# Patient Record
Sex: FEMALE | Race: WHITE | NOT HISPANIC OR LATINO | URBAN - METROPOLITAN AREA
[De-identification: names, ages, dates, MRNs, and addresses within clinical notes are randomized per-mention and may not be internally consistent; named-entity substitution may affect disease eponyms.]

---

## 2018-01-16 ENCOUNTER — EMERGENCY (EMERGENCY)
Facility: HOSPITAL | Age: 28
LOS: 1 days | Discharge: HOME | End: 2018-01-16
Attending: EMERGENCY MEDICINE | Admitting: EMERGENCY MEDICINE
Payer: COMMERCIAL

## 2018-01-16 VITALS
RESPIRATION RATE: 16 BRPM | HEART RATE: 77 BPM | TEMPERATURE: 99 F | SYSTOLIC BLOOD PRESSURE: 145 MMHG | OXYGEN SATURATION: 98 % | DIASTOLIC BLOOD PRESSURE: 82 MMHG | WEIGHT: 244.93 LBS

## 2018-01-16 VITALS
HEART RATE: 72 BPM | RESPIRATION RATE: 15 BRPM | OXYGEN SATURATION: 99 % | TEMPERATURE: 98 F | DIASTOLIC BLOOD PRESSURE: 78 MMHG | SYSTOLIC BLOOD PRESSURE: 141 MMHG

## 2018-01-16 PROCEDURE — 72125 CT NECK SPINE W/O DYE: CPT

## 2018-01-16 PROCEDURE — 71046 X-RAY EXAM CHEST 2 VIEWS: CPT | Mod: 26

## 2018-01-16 PROCEDURE — 72040 X-RAY EXAM NECK SPINE 2-3 VW: CPT | Mod: 26

## 2018-01-16 PROCEDURE — 99284 EMERGENCY DEPT VISIT MOD MDM: CPT

## 2018-01-16 PROCEDURE — 93005 ELECTROCARDIOGRAM TRACING: CPT

## 2018-01-16 PROCEDURE — 72040 X-RAY EXAM NECK SPINE 2-3 VW: CPT

## 2018-01-16 PROCEDURE — 99284 EMERGENCY DEPT VISIT MOD MDM: CPT | Mod: 25

## 2018-01-16 PROCEDURE — 71046 X-RAY EXAM CHEST 2 VIEWS: CPT

## 2018-01-16 PROCEDURE — 72125 CT NECK SPINE W/O DYE: CPT | Mod: 26

## 2018-01-16 RX ORDER — IBUPROFEN 200 MG
600 TABLET ORAL ONCE
Qty: 0 | Refills: 0 | Status: COMPLETED | OUTPATIENT
Start: 2018-01-16 | End: 2018-01-16

## 2018-01-16 RX ORDER — METHOCARBAMOL 500 MG/1
1500 TABLET, FILM COATED ORAL ONCE
Qty: 0 | Refills: 0 | Status: COMPLETED | OUTPATIENT
Start: 2018-01-16 | End: 2018-01-16

## 2018-01-16 RX ORDER — IBUPROFEN 200 MG
1 TABLET ORAL
Qty: 15 | Refills: 0
Start: 2018-01-16 | End: 2018-01-20

## 2018-01-16 RX ORDER — METHOCARBAMOL 500 MG/1
1 TABLET, FILM COATED ORAL
Qty: 15 | Refills: 0
Start: 2018-01-16 | End: 2018-01-20

## 2018-01-16 RX ADMIN — Medication 600 MILLIGRAM(S): at 18:52

## 2018-01-16 RX ADMIN — Medication 600 MILLIGRAM(S): at 17:53

## 2018-01-16 RX ADMIN — METHOCARBAMOL 1500 MILLIGRAM(S): 500 TABLET, FILM COATED ORAL at 17:53

## 2018-01-16 NOTE — ED PROVIDER NOTE - CARDIAC, MLM
NO CHEST WALL TENDERNESS. NO CHEST WALL DEFORMITY. Normal rate, regular rhythm.  Heart sounds S1, S2.  No murmurs, rubs or gallops.
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
## 2018-01-16 NOTE — ED PROVIDER NOTE - CHPI ED SYMPTOMS NEG
no difficulty bearing weight/no decreased eating/drinking/no disorientation/no laceration/no back pain/no headache/no dizziness
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
## 2018-01-16 NOTE — ED PROVIDER NOTE - PHYSICAL EXAMINATION
Spine Exam: Cervical: No erythema, ecchymosis, or visible deformity. No midline tenderness or step-off appreciated on palpation. +left trapezius ttp. no paravertebral tenderness. + left trapezius muscle spasm. FROM. NEG NEXUS criteria.   Thoracic: No erythema, ecchymosis, or visible deformity. No midline tenderness or step-off appreciated on palpation. No paravertebral tenderness. No muscle spasm.   Lumbosacral: No erythema, ecchymosis, or visible deformity. No midline tenderness or step-off appreciated on palpation. No paravertebral tenderness. No muscle spasm.  MS: FROM OF BL UE. SENSATION GROSSLY INTACT BL UE.   PV:+ 2 RADIAL PULSES
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
## 2020-01-18 ENCOUNTER — TRANSCRIPTION ENCOUNTER (OUTPATIENT)
Age: 30
End: 2020-01-18

## 2020-02-07 ENCOUNTER — TRANSCRIPTION ENCOUNTER (OUTPATIENT)
Age: 30
End: 2020-02-07

## 2020-05-28 ENCOUNTER — INPATIENT (INPATIENT)
Facility: HOSPITAL | Age: 30
LOS: 2 days | Discharge: HOME | End: 2020-05-31
Attending: OBSTETRICS & GYNECOLOGY | Admitting: OBSTETRICS & GYNECOLOGY
Payer: COMMERCIAL

## 2020-05-28 VITALS — HEIGHT: 66 IN | WEIGHT: 253.53 LBS

## 2020-05-28 DIAGNOSIS — O48.0 POST-TERM PREGNANCY: ICD-10-CM

## 2020-05-28 LAB
BASOPHILS # BLD AUTO: 0.03 K/UL — SIGNIFICANT CHANGE UP (ref 0–0.2)
BASOPHILS NFR BLD AUTO: 0.2 % — SIGNIFICANT CHANGE UP (ref 0–2)
BLD GP AB SCN SERPL QL: NEGATIVE — SIGNIFICANT CHANGE UP
EOSINOPHIL # BLD AUTO: 0.13 K/UL — SIGNIFICANT CHANGE UP (ref 0–0.5)
EOSINOPHIL NFR BLD AUTO: 1 % — SIGNIFICANT CHANGE UP (ref 0–6)
HCT VFR BLD CALC: 36 % — SIGNIFICANT CHANGE UP (ref 34.5–45)
HGB BLD-MCNC: 11.9 G/DL — SIGNIFICANT CHANGE UP (ref 11.5–15.5)
IMM GRANULOCYTES NFR BLD AUTO: 0.5 % — SIGNIFICANT CHANGE UP (ref 0–1.5)
LYMPHOCYTES # BLD AUTO: 2.51 K/UL — SIGNIFICANT CHANGE UP (ref 1–3.3)
LYMPHOCYTES # BLD AUTO: 20.1 % — SIGNIFICANT CHANGE UP (ref 13–44)
MCHC RBC-ENTMCNC: 26.8 PG — LOW (ref 27–34)
MCHC RBC-ENTMCNC: 33.1 GM/DL — SIGNIFICANT CHANGE UP (ref 32–36)
MCV RBC AUTO: 81.1 FL — SIGNIFICANT CHANGE UP (ref 80–100)
MONOCYTES # BLD AUTO: 0.88 K/UL — SIGNIFICANT CHANGE UP (ref 0–0.9)
MONOCYTES NFR BLD AUTO: 7 % — SIGNIFICANT CHANGE UP (ref 2–14)
NEUTROPHILS # BLD AUTO: 8.89 K/UL — HIGH (ref 1.8–7.4)
NEUTROPHILS NFR BLD AUTO: 71.2 % — SIGNIFICANT CHANGE UP (ref 43–77)
NRBC # BLD: 0 /100 WBCS — SIGNIFICANT CHANGE UP (ref 0–0)
PLATELET # BLD AUTO: 215 K/UL — SIGNIFICANT CHANGE UP (ref 150–400)
RBC # BLD: 4.44 M/UL — SIGNIFICANT CHANGE UP (ref 3.8–5.2)
RBC # FLD: 14.9 % — HIGH (ref 10.3–14.5)
RH IG SCN BLD-IMP: POSITIVE — SIGNIFICANT CHANGE UP
RH IG SCN BLD-IMP: POSITIVE — SIGNIFICANT CHANGE UP
SARS-COV-2 RNA SPEC QL NAA+PROBE: SIGNIFICANT CHANGE UP
WBC # BLD: 12.5 K/UL — HIGH (ref 3.8–10.5)
WBC # FLD AUTO: 12.5 K/UL — HIGH (ref 3.8–10.5)

## 2020-05-28 RX ORDER — OXYTOCIN 10 UNIT/ML
333.33 VIAL (ML) INJECTION
Qty: 20 | Refills: 0 | Status: DISCONTINUED | OUTPATIENT
Start: 2020-05-28 | End: 2020-05-29

## 2020-05-28 RX ORDER — VANCOMYCIN HCL 1 G
1000 VIAL (EA) INTRAVENOUS EVERY 12 HOURS
Refills: 0 | Status: DISCONTINUED | OUTPATIENT
Start: 2020-05-29 | End: 2020-05-29

## 2020-05-28 RX ORDER — VANCOMYCIN HCL 1 G
1000 VIAL (EA) INTRAVENOUS ONCE
Refills: 0 | Status: COMPLETED | OUTPATIENT
Start: 2020-05-28 | End: 2020-05-28

## 2020-05-28 RX ORDER — SODIUM CHLORIDE 9 MG/ML
1000 INJECTION, SOLUTION INTRAVENOUS
Refills: 0 | Status: DISCONTINUED | OUTPATIENT
Start: 2020-05-28 | End: 2020-05-29

## 2020-05-28 RX ORDER — CITRIC ACID/SODIUM CITRATE 300-500 MG
15 SOLUTION, ORAL ORAL EVERY 6 HOURS
Refills: 0 | Status: DISCONTINUED | OUTPATIENT
Start: 2020-05-28 | End: 2020-05-29

## 2020-05-28 RX ORDER — VANCOMYCIN HCL 1 G
VIAL (EA) INTRAVENOUS
Refills: 0 | Status: DISCONTINUED | OUTPATIENT
Start: 2020-05-28 | End: 2020-05-29

## 2020-05-28 RX ADMIN — SODIUM CHLORIDE 125 MILLILITER(S): 9 INJECTION, SOLUTION INTRAVENOUS at 19:47

## 2020-05-28 RX ADMIN — Medication 250 MILLIGRAM(S): at 20:13

## 2020-05-29 LAB
ALBUMIN SERPL ELPH-MCNC: 3.4 G/DL — SIGNIFICANT CHANGE UP (ref 3.3–5)
ALP SERPL-CCNC: 134 U/L — HIGH (ref 40–120)
ALT FLD-CCNC: 13 U/L — SIGNIFICANT CHANGE UP (ref 10–45)
ANION GAP SERPL CALC-SCNC: 13 MMOL/L — SIGNIFICANT CHANGE UP (ref 5–17)
APPEARANCE UR: ABNORMAL
APTT BLD: 29.7 SEC — SIGNIFICANT CHANGE UP (ref 27.5–36.3)
AST SERPL-CCNC: 21 U/L — SIGNIFICANT CHANGE UP (ref 10–40)
BACTERIA # UR AUTO: NEGATIVE — SIGNIFICANT CHANGE UP
BILIRUB SERPL-MCNC: 0.1 MG/DL — LOW (ref 0.2–1.2)
BILIRUB UR-MCNC: NEGATIVE — SIGNIFICANT CHANGE UP
BUN SERPL-MCNC: 8 MG/DL — SIGNIFICANT CHANGE UP (ref 7–23)
CALCIUM SERPL-MCNC: 9.4 MG/DL — SIGNIFICANT CHANGE UP (ref 8.4–10.5)
CHLORIDE SERPL-SCNC: 105 MMOL/L — SIGNIFICANT CHANGE UP (ref 96–108)
CO2 SERPL-SCNC: 20 MMOL/L — LOW (ref 22–31)
COLOR SPEC: YELLOW — SIGNIFICANT CHANGE UP
CREAT ?TM UR-MCNC: 105 MG/DL — SIGNIFICANT CHANGE UP
CREAT SERPL-MCNC: 0.59 MG/DL — SIGNIFICANT CHANGE UP (ref 0.5–1.3)
DIFF PNL FLD: ABNORMAL
EPI CELLS # UR: 6 /HPF — HIGH
FIBRINOGEN PPP-MCNC: 683 MG/DL — HIGH (ref 350–510)
GLUCOSE SERPL-MCNC: 97 MG/DL — SIGNIFICANT CHANGE UP (ref 70–99)
GLUCOSE UR QL: NEGATIVE — SIGNIFICANT CHANGE UP
HYALINE CASTS # UR AUTO: 5 /LPF — HIGH (ref 0–2)
INR BLD: 0.9 RATIO — SIGNIFICANT CHANGE UP (ref 0.88–1.16)
KETONES UR-MCNC: NEGATIVE — SIGNIFICANT CHANGE UP
LDH SERPL L TO P-CCNC: 180 U/L — SIGNIFICANT CHANGE UP (ref 50–242)
LEUKOCYTE ESTERASE UR-ACNC: NEGATIVE — SIGNIFICANT CHANGE UP
NITRITE UR-MCNC: NEGATIVE — SIGNIFICANT CHANGE UP
PH UR: 6 — SIGNIFICANT CHANGE UP (ref 5–8)
POTASSIUM SERPL-MCNC: 4.6 MMOL/L — SIGNIFICANT CHANGE UP (ref 3.5–5.3)
POTASSIUM SERPL-SCNC: 4.6 MMOL/L — SIGNIFICANT CHANGE UP (ref 3.5–5.3)
PROT ?TM UR-MCNC: 17 MG/DL — HIGH (ref 0–12)
PROT SERPL-MCNC: 6.3 G/DL — SIGNIFICANT CHANGE UP (ref 6–8.3)
PROT UR-MCNC: ABNORMAL
PROT/CREAT UR-RTO: 0.2 RATIO — SIGNIFICANT CHANGE UP (ref 0–0.2)
PROTHROM AB SERPL-ACNC: 10.3 SEC — SIGNIFICANT CHANGE UP (ref 10–12.9)
RBC CASTS # UR COMP ASSIST: 58 /HPF — HIGH (ref 0–4)
SODIUM SERPL-SCNC: 138 MMOL/L — SIGNIFICANT CHANGE UP (ref 135–145)
SP GR SPEC: 1.02 — SIGNIFICANT CHANGE UP (ref 1.01–1.02)
T PALLIDUM AB TITR SER: NEGATIVE — SIGNIFICANT CHANGE UP
URATE SERPL-MCNC: 5.7 MG/DL — SIGNIFICANT CHANGE UP (ref 2.5–7)
UROBILINOGEN FLD QL: NEGATIVE — SIGNIFICANT CHANGE UP
WBC UR QL: 3 /HPF — SIGNIFICANT CHANGE UP (ref 0–5)

## 2020-05-29 RX ORDER — SIMETHICONE 80 MG/1
80 TABLET, CHEWABLE ORAL EVERY 4 HOURS
Refills: 0 | Status: DISCONTINUED | OUTPATIENT
Start: 2020-05-29 | End: 2020-05-31

## 2020-05-29 RX ORDER — OXYTOCIN 10 UNIT/ML
333.33 VIAL (ML) INJECTION
Qty: 20 | Refills: 0 | Status: DISCONTINUED | OUTPATIENT
Start: 2020-05-29 | End: 2020-05-31

## 2020-05-29 RX ORDER — OXYCODONE HYDROCHLORIDE 5 MG/1
5 TABLET ORAL
Refills: 0 | Status: DISCONTINUED | OUTPATIENT
Start: 2020-05-29 | End: 2020-05-31

## 2020-05-29 RX ORDER — DIBUCAINE 1 %
1 OINTMENT (GRAM) RECTAL EVERY 6 HOURS
Refills: 0 | Status: DISCONTINUED | OUTPATIENT
Start: 2020-05-29 | End: 2020-05-31

## 2020-05-29 RX ORDER — OXYCODONE HYDROCHLORIDE 5 MG/1
5 TABLET ORAL ONCE
Refills: 0 | Status: DISCONTINUED | OUTPATIENT
Start: 2020-05-29 | End: 2020-05-31

## 2020-05-29 RX ORDER — KETOROLAC TROMETHAMINE 30 MG/ML
30 SYRINGE (ML) INJECTION ONCE
Refills: 0 | Status: DISCONTINUED | OUTPATIENT
Start: 2020-05-29 | End: 2020-05-29

## 2020-05-29 RX ORDER — BENZOCAINE 10 %
1 GEL (GRAM) MUCOUS MEMBRANE EVERY 6 HOURS
Refills: 0 | Status: DISCONTINUED | OUTPATIENT
Start: 2020-05-29 | End: 2020-05-31

## 2020-05-29 RX ORDER — IBUPROFEN 200 MG
600 TABLET ORAL EVERY 6 HOURS
Refills: 0 | Status: COMPLETED | OUTPATIENT
Start: 2020-05-29 | End: 2021-04-27

## 2020-05-29 RX ORDER — LANOLIN
1 OINTMENT (GRAM) TOPICAL EVERY 6 HOURS
Refills: 0 | Status: DISCONTINUED | OUTPATIENT
Start: 2020-05-29 | End: 2020-05-31

## 2020-05-29 RX ORDER — ACETAMINOPHEN 500 MG
975 TABLET ORAL
Refills: 0 | Status: DISCONTINUED | OUTPATIENT
Start: 2020-05-29 | End: 2020-05-31

## 2020-05-29 RX ORDER — DIPHENHYDRAMINE HCL 50 MG
25 CAPSULE ORAL EVERY 6 HOURS
Refills: 0 | Status: DISCONTINUED | OUTPATIENT
Start: 2020-05-29 | End: 2020-05-31

## 2020-05-29 RX ORDER — MAGNESIUM HYDROXIDE 400 MG/1
30 TABLET, CHEWABLE ORAL
Refills: 0 | Status: DISCONTINUED | OUTPATIENT
Start: 2020-05-29 | End: 2020-05-31

## 2020-05-29 RX ORDER — OXYTOCIN 10 UNIT/ML
4 VIAL (ML) INJECTION
Qty: 30 | Refills: 0 | Status: DISCONTINUED | OUTPATIENT
Start: 2020-05-29 | End: 2020-05-31

## 2020-05-29 RX ORDER — SODIUM CHLORIDE 9 MG/ML
3 INJECTION INTRAMUSCULAR; INTRAVENOUS; SUBCUTANEOUS EVERY 8 HOURS
Refills: 0 | Status: DISCONTINUED | OUTPATIENT
Start: 2020-05-29 | End: 2020-05-31

## 2020-05-29 RX ORDER — TETANUS TOXOID, REDUCED DIPHTHERIA TOXOID AND ACELLULAR PERTUSSIS VACCINE, ADSORBED 5; 2.5; 8; 8; 2.5 [IU]/.5ML; [IU]/.5ML; UG/.5ML; UG/.5ML; UG/.5ML
0.5 SUSPENSION INTRAMUSCULAR ONCE
Refills: 0 | Status: DISCONTINUED | OUTPATIENT
Start: 2020-05-29 | End: 2020-05-31

## 2020-05-29 RX ORDER — AER TRAVELER 0.5 G/1
1 SOLUTION RECTAL; TOPICAL EVERY 4 HOURS
Refills: 0 | Status: DISCONTINUED | OUTPATIENT
Start: 2020-05-29 | End: 2020-05-31

## 2020-05-29 RX ORDER — PRAMOXINE HYDROCHLORIDE 150 MG/15G
1 AEROSOL, FOAM RECTAL EVERY 4 HOURS
Refills: 0 | Status: DISCONTINUED | OUTPATIENT
Start: 2020-05-29 | End: 2020-05-31

## 2020-05-29 RX ORDER — HYDROCORTISONE 1 %
1 OINTMENT (GRAM) TOPICAL EVERY 6 HOURS
Refills: 0 | Status: DISCONTINUED | OUTPATIENT
Start: 2020-05-29 | End: 2020-05-31

## 2020-05-29 RX ADMIN — Medication 250 MILLIGRAM(S): at 10:30

## 2020-05-29 RX ADMIN — Medication 30 MILLIGRAM(S): at 16:40

## 2020-05-29 RX ADMIN — Medication 4 MILLIUNIT(S)/MIN: at 08:06

## 2020-05-29 RX ADMIN — Medication 975 MILLIGRAM(S): at 21:45

## 2020-05-29 RX ADMIN — SODIUM CHLORIDE 125 MILLILITER(S): 9 INJECTION, SOLUTION INTRAVENOUS at 04:21

## 2020-05-30 RX ORDER — IBUPROFEN 200 MG
600 TABLET ORAL EVERY 6 HOURS
Refills: 0 | Status: DISCONTINUED | OUTPATIENT
Start: 2020-05-30 | End: 2020-05-31

## 2020-05-30 RX ADMIN — Medication 600 MILLIGRAM(S): at 09:26

## 2020-05-30 RX ADMIN — Medication 600 MILLIGRAM(S): at 15:08

## 2020-05-30 RX ADMIN — SODIUM CHLORIDE 3 MILLILITER(S): 9 INJECTION INTRAMUSCULAR; INTRAVENOUS; SUBCUTANEOUS at 06:05

## 2020-05-30 RX ADMIN — Medication 975 MILLIGRAM(S): at 12:29

## 2020-05-30 RX ADMIN — Medication 975 MILLIGRAM(S): at 06:14

## 2020-05-30 RX ADMIN — Medication 975 MILLIGRAM(S): at 18:07

## 2020-05-30 RX ADMIN — OXYCODONE HYDROCHLORIDE 5 MILLIGRAM(S): 5 TABLET ORAL at 06:14

## 2020-05-30 RX ADMIN — Medication 600 MILLIGRAM(S): at 00:19

## 2020-05-30 RX ADMIN — Medication 1 TABLET(S): at 12:29

## 2020-05-30 RX ADMIN — OXYCODONE HYDROCHLORIDE 5 MILLIGRAM(S): 5 TABLET ORAL at 12:31

## 2020-05-30 RX ADMIN — MAGNESIUM HYDROXIDE 30 MILLILITER(S): 400 TABLET, CHEWABLE ORAL at 06:12

## 2020-05-30 RX ADMIN — Medication 600 MILLIGRAM(S): at 20:23

## 2020-05-30 NOTE — PROGRESS NOTE ADULT - SUBJECTIVE AND OBJECTIVE BOX
OB Progress Note:  PPD#1    S: 28yo  PPD#1 s/p . Patient feels well. Pain is well controlled, tolerating regular diet, passing flatus, voiding spontaneously, ambulating without difficulty. Denies heavy vaginal bleeding, CP/SOB, N/V, lightheadedness/dizziness.     O:  Vitals:  Vital Signs Last 24 Hrs  T(C): 36.8 (30 May 2020 01:00), Max: 36.8 (29 May 2020 22:32)  T(F): 98.2 (30 May 2020 01:00), Max: 98.2 (29 May 2020 22:32)  HR: 94 (30 May 2020 01:00) (82 - 106)  BP: 110/75 (30 May 2020 01:00) (102/50 - 150/68)  BP(mean): 101 (29 May 2020 20:06) (84 - 104)  RR: 18 (30 May 2020 01:00) (16 - 18)  SpO2: 98% (30 May 2020 01:00) (97% - 100%)    MEDICATIONS  (STANDING):  acetaminophen   Tablet .. 975 milliGRAM(s) Oral <User Schedule>  diphtheria/tetanus/pertussis (acellular) Vaccine (ADAcel) 0.5 milliLiter(s) IntraMuscular once  ibuprofen  Tablet. 600 milliGRAM(s) Oral every 6 hours  oxytocin Infusion 4 milliUNIT(s)/Min (4 mL/Hr) IV Continuous <Continuous>  oxytocin Infusion 333.333 milliUNIT(s)/Min (1000 mL/Hr) IV Continuous <Continuous>  prenatal multivitamin 1 Tablet(s) Oral daily  sodium chloride 0.9% lock flush 3 milliLiter(s) IV Push every 8 hours      Labs:  Blood type: A Positive  Rubella IgG: RPR: Negative                          11.9   12.50<H> >-----------< 215    (  @ 20:16 )             36.0    20 @ 01:27      138  |  105  |  8   ----------------------------<  97  4.6   |  20<L>  |  0.59        Ca    9.4      29 May 2020 01:27    TPro  6.3  /  Alb  3.4  /  TBili  0.1<L>  /  DBili  x   /  AST  21  /  ALT  13  /  AlkPhos  134<H>  20 @ 01:27          Physical Exam:  General: NAD  Abdomen: soft, non-tender, non-distended, fundus firm  Vaginal: No heavy vaginal bleeding  Extremities: No erythema/edema

## 2020-05-30 NOTE — PROGRESS NOTE ADULT - PROBLEM SELECTOR PLAN 1
- Pain well controlled, continue current pain regimen  - Increase ambulation, SCDs when not ambulating  - Continue regular diet    Mariajose Vicente, PGY-1

## 2020-05-30 NOTE — PROGRESS NOTE ADULT - SUBJECTIVE AND OBJECTIVE BOX
PPD#1- ATTENDING NOTE    S: Patient doing well. Minimal lochia. Pain controlled.    O: Vital Signs Last 24 Hrs  T(C): 36.8 (30 May 2020 01:00), Max: 36.8 (29 May 2020 22:32)  T(F): 98.2 (30 May 2020 01:00), Max: 98.2 (29 May 2020 22:32)  HR: 94 (30 May 2020 01:00) (82 - 106)  BP: 110/75 (30 May 2020 01:00) (102/50 - 150/68)  BP(mean): 101 (29 May 2020 20:06) (84 - 104)  RR: 18 (30 May 2020 01:00) (16 - 18)  SpO2: 98% (30 May 2020 01:00) (97% - 100%)    Gen: NAD  Abd: soft, NT, ND, fundus firm below umbilicus  Lochia: moderate  Ext: no tenderness, no hyper reflexia,     Labs:                            11.9   12.50 )-----------( 215      ( 28 May 2020 20:16 )             36.0       A: 29y PPD#1 s/p  doing well.    Plan:  Analgesia prn  Regular diet    Routine post partum care

## 2020-05-31 ENCOUNTER — TRANSCRIPTION ENCOUNTER (OUTPATIENT)
Age: 30
End: 2020-05-31

## 2020-05-31 VITALS
HEART RATE: 74 BPM | RESPIRATION RATE: 18 BRPM | TEMPERATURE: 98 F | OXYGEN SATURATION: 98 % | SYSTOLIC BLOOD PRESSURE: 130 MMHG | DIASTOLIC BLOOD PRESSURE: 84 MMHG

## 2020-05-31 PROCEDURE — 85730 THROMBOPLASTIN TIME PARTIAL: CPT

## 2020-05-31 PROCEDURE — 84550 ASSAY OF BLOOD/URIC ACID: CPT

## 2020-05-31 PROCEDURE — 85027 COMPLETE CBC AUTOMATED: CPT

## 2020-05-31 PROCEDURE — 59025 FETAL NON-STRESS TEST: CPT

## 2020-05-31 PROCEDURE — 80053 COMPREHEN METABOLIC PANEL: CPT

## 2020-05-31 PROCEDURE — 84156 ASSAY OF PROTEIN URINE: CPT

## 2020-05-31 PROCEDURE — 81001 URINALYSIS AUTO W/SCOPE: CPT

## 2020-05-31 PROCEDURE — 86780 TREPONEMA PALLIDUM: CPT

## 2020-05-31 PROCEDURE — 86900 BLOOD TYPING SEROLOGIC ABO: CPT

## 2020-05-31 PROCEDURE — 59050 FETAL MONITOR W/REPORT: CPT

## 2020-05-31 PROCEDURE — 86850 RBC ANTIBODY SCREEN: CPT

## 2020-05-31 PROCEDURE — 82570 ASSAY OF URINE CREATININE: CPT

## 2020-05-31 PROCEDURE — 83615 LACTATE (LD) (LDH) ENZYME: CPT

## 2020-05-31 PROCEDURE — 85384 FIBRINOGEN ACTIVITY: CPT

## 2020-05-31 PROCEDURE — 85610 PROTHROMBIN TIME: CPT

## 2020-05-31 PROCEDURE — 86901 BLOOD TYPING SEROLOGIC RH(D): CPT

## 2020-05-31 RX ADMIN — Medication 600 MILLIGRAM(S): at 04:06

## 2020-05-31 RX ADMIN — Medication 975 MILLIGRAM(S): at 06:04

## 2020-05-31 RX ADMIN — Medication 975 MILLIGRAM(S): at 00:16

## 2020-05-31 RX ADMIN — Medication 600 MILLIGRAM(S): at 09:22

## 2020-05-31 NOTE — DISCHARGE NOTE OB - PATIENT PORTAL LINK FT
You can access the FollowMyHealth Patient Portal offered by [facility] by registering at the following website: [email or web address]. By joining [facility]’s FollowMyHealth portal, you will also be able to view your health information using other applications (apps) compatible with our system.

## 2020-05-31 NOTE — PROGRESS NOTE ADULT - SUBJECTIVE AND OBJECTIVE BOX
PPD#2- ATTENDING NOTE    S: Patient doing well. Minimal lochia. Pain controlled.    O: Vital Signs Last 24 Hrs  T(C): 36.6 (31 May 2020 06:44), Max: 36.9 (30 May 2020 17:15)  T(F): 97.9 (31 May 2020 06:44), Max: 98.4 (30 May 2020 17:15)  HR: 74 (31 May 2020 06:44) (72 - 74)  BP: 130/84 (31 May 2020 06:44) (125/79 - 130/84)  BP(mean): --  RR: 18 (31 May 2020 06:44) (17 - 18)  SpO2: 98% (31 May 2020 06:44) (98% - 98%)    Gen: NAD  Abd: soft, NT, ND, fundus firm below umbilicus  Lochia: moderate  Ext: no tenderness, no hyper reflexia    Labs:        A: 29y PPD# s/p  doing well.    Plan:  Analgesia prn  Regular diet  Discharge instruction given  F/U 6 weeks

## 2020-05-31 NOTE — DISCHARGE NOTE OB - CARE PROVIDER_API CALL
Chicho Rodriguez  OBSTETRICS AND GYNECOLOGY  [address] [place], [place] [address]  Phone: [phone]  Fax: [phone]  Follow Up Time:

## 2020-05-31 NOTE — DISCHARGE NOTE OB - MEDICATION SUMMARY - MEDICATIONS TO TAKE
I will START or STAY ON the medications listed below when I get home from the hospital:    ibuprofen 600 mg oral tablet  -- 1 tab(s) by mouth every 8 hours, As Needed for pain  -- Do not take this drug if you are pregnant.  It is very important that you take or use this exactly as directed.  Do not skip doses or discontinue unless directed by your doctor.  May cause drowsiness or dizziness.  Obtain medical advice before taking any non-prescription drugs as some may affect the action of this medication.  Take with food or milk.    -- Indication: For Pain

## 2020-09-09 ENCOUNTER — EMERGENCY (EMERGENCY)
Facility: HOSPITAL | Age: 30
LOS: 1 days | Discharge: HOME | End: 2020-09-09
Attending: STUDENT IN AN ORGANIZED HEALTH CARE EDUCATION/TRAINING PROGRAM | Admitting: STUDENT IN AN ORGANIZED HEALTH CARE EDUCATION/TRAINING PROGRAM
Payer: COMMERCIAL

## 2020-09-09 VITALS
OXYGEN SATURATION: 97 % | HEART RATE: 68 BPM | TEMPERATURE: 98 F | SYSTOLIC BLOOD PRESSURE: 127 MMHG | DIASTOLIC BLOOD PRESSURE: 68 MMHG | RESPIRATION RATE: 18 BRPM

## 2020-09-09 VITALS
TEMPERATURE: 98 F | DIASTOLIC BLOOD PRESSURE: 92 MMHG | HEART RATE: 88 BPM | HEIGHT: 66 IN | WEIGHT: 240.08 LBS | SYSTOLIC BLOOD PRESSURE: 148 MMHG | OXYGEN SATURATION: 99 % | RESPIRATION RATE: 18 BRPM

## 2020-09-09 LAB
ALBUMIN SERPL ELPH-MCNC: 3.4 G/DL — SIGNIFICANT CHANGE UP (ref 3.3–5)
ALP SERPL-CCNC: 78 U/L — SIGNIFICANT CHANGE UP (ref 40–120)
ALT FLD-CCNC: 39 U/L — SIGNIFICANT CHANGE UP (ref 12–78)
AMYLASE P1 CFR SERPL: 28 U/L — SIGNIFICANT CHANGE UP (ref 25–125)
ANION GAP SERPL CALC-SCNC: 3 MMOL/L — LOW (ref 5–17)
APPEARANCE UR: CLEAR — SIGNIFICANT CHANGE UP
AST SERPL-CCNC: 38 U/L — HIGH (ref 15–37)
BACTERIA # UR AUTO: NEGATIVE — SIGNIFICANT CHANGE UP
BASOPHILS # BLD AUTO: 0.05 K/UL — SIGNIFICANT CHANGE UP (ref 0–0.2)
BASOPHILS NFR BLD AUTO: 0.5 % — SIGNIFICANT CHANGE UP (ref 0–2)
BILIRUB SERPL-MCNC: 0.2 MG/DL — SIGNIFICANT CHANGE UP (ref 0.2–1.2)
BILIRUB UR-MCNC: NEGATIVE — SIGNIFICANT CHANGE UP
BUN SERPL-MCNC: 15 MG/DL — SIGNIFICANT CHANGE UP (ref 7–23)
CALCIUM SERPL-MCNC: 8.7 MG/DL — SIGNIFICANT CHANGE UP (ref 8.5–10.1)
CHLORIDE SERPL-SCNC: 109 MMOL/L — HIGH (ref 96–108)
CO2 SERPL-SCNC: 25 MMOL/L — SIGNIFICANT CHANGE UP (ref 22–31)
COLOR SPEC: YELLOW — SIGNIFICANT CHANGE UP
CREAT SERPL-MCNC: 0.65 MG/DL — SIGNIFICANT CHANGE UP (ref 0.5–1.3)
D DIMER BLD IA.RAPID-MCNC: <150 NG/ML DDU — SIGNIFICANT CHANGE UP
DIFF PNL FLD: NEGATIVE — SIGNIFICANT CHANGE UP
EOSINOPHIL # BLD AUTO: 0.28 K/UL — SIGNIFICANT CHANGE UP (ref 0–0.5)
EOSINOPHIL NFR BLD AUTO: 2.5 % — SIGNIFICANT CHANGE UP (ref 0–6)
EPI CELLS # UR: SIGNIFICANT CHANGE UP
GLUCOSE SERPL-MCNC: 113 MG/DL — HIGH (ref 70–99)
GLUCOSE UR QL: NEGATIVE — SIGNIFICANT CHANGE UP
HCG UR QL: NEGATIVE — SIGNIFICANT CHANGE UP
HCT VFR BLD CALC: 42.9 % — SIGNIFICANT CHANGE UP (ref 34.5–45)
HGB BLD-MCNC: 13.8 G/DL — SIGNIFICANT CHANGE UP (ref 11.5–15.5)
IMM GRANULOCYTES NFR BLD AUTO: 0.3 % — SIGNIFICANT CHANGE UP (ref 0–1.5)
KETONES UR-MCNC: NEGATIVE — SIGNIFICANT CHANGE UP
LEUKOCYTE ESTERASE UR-ACNC: NEGATIVE — SIGNIFICANT CHANGE UP
LIDOCAIN IGE QN: 86 U/L — SIGNIFICANT CHANGE UP (ref 73–393)
LYMPHOCYTES # BLD AUTO: 2.77 K/UL — SIGNIFICANT CHANGE UP (ref 1–3.3)
LYMPHOCYTES # BLD AUTO: 25 % — SIGNIFICANT CHANGE UP (ref 13–44)
MCHC RBC-ENTMCNC: 25.7 PG — LOW (ref 27–34)
MCHC RBC-ENTMCNC: 32.2 GM/DL — SIGNIFICANT CHANGE UP (ref 32–36)
MCV RBC AUTO: 79.9 FL — LOW (ref 80–100)
MONOCYTES # BLD AUTO: 0.79 K/UL — SIGNIFICANT CHANGE UP (ref 0–0.9)
MONOCYTES NFR BLD AUTO: 7.1 % — SIGNIFICANT CHANGE UP (ref 2–14)
NEUTROPHILS # BLD AUTO: 7.16 K/UL — SIGNIFICANT CHANGE UP (ref 1.8–7.4)
NEUTROPHILS NFR BLD AUTO: 64.6 % — SIGNIFICANT CHANGE UP (ref 43–77)
NITRITE UR-MCNC: NEGATIVE — SIGNIFICANT CHANGE UP
NRBC # BLD: 0 /100 WBCS — SIGNIFICANT CHANGE UP (ref 0–0)
PH UR: 7 — SIGNIFICANT CHANGE UP (ref 5–8)
PLATELET # BLD AUTO: 306 K/UL — SIGNIFICANT CHANGE UP (ref 150–400)
POTASSIUM SERPL-MCNC: 4.6 MMOL/L — SIGNIFICANT CHANGE UP (ref 3.5–5.3)
POTASSIUM SERPL-SCNC: 4.6 MMOL/L — SIGNIFICANT CHANGE UP (ref 3.5–5.3)
PROT SERPL-MCNC: 6.7 G/DL — SIGNIFICANT CHANGE UP (ref 6–8.3)
PROT UR-MCNC: NEGATIVE — SIGNIFICANT CHANGE UP
RBC # BLD: 5.37 M/UL — HIGH (ref 3.8–5.2)
RBC # FLD: 15.2 % — HIGH (ref 10.3–14.5)
RBC CASTS # UR COMP ASSIST: SIGNIFICANT CHANGE UP /HPF (ref 0–4)
SODIUM SERPL-SCNC: 137 MMOL/L — SIGNIFICANT CHANGE UP (ref 135–145)
SP GR SPEC: 1.01 — SIGNIFICANT CHANGE UP (ref 1.01–1.02)
UROBILINOGEN FLD QL: NEGATIVE — SIGNIFICANT CHANGE UP
WBC # BLD: 11.08 K/UL — HIGH (ref 3.8–10.5)
WBC # FLD AUTO: 11.08 K/UL — HIGH (ref 3.8–10.5)
WBC UR QL: SIGNIFICANT CHANGE UP

## 2020-09-09 PROCEDURE — 99284 EMERGENCY DEPT VISIT MOD MDM: CPT

## 2020-09-09 PROCEDURE — 80053 COMPREHEN METABOLIC PANEL: CPT

## 2020-09-09 PROCEDURE — 82150 ASSAY OF AMYLASE: CPT

## 2020-09-09 PROCEDURE — 83690 ASSAY OF LIPASE: CPT

## 2020-09-09 PROCEDURE — 99284 EMERGENCY DEPT VISIT MOD MDM: CPT | Mod: 25

## 2020-09-09 PROCEDURE — 85025 COMPLETE CBC W/AUTO DIFF WBC: CPT

## 2020-09-09 PROCEDURE — 81025 URINE PREGNANCY TEST: CPT

## 2020-09-09 PROCEDURE — 96374 THER/PROPH/DIAG INJ IV PUSH: CPT

## 2020-09-09 PROCEDURE — 76705 ECHO EXAM OF ABDOMEN: CPT | Mod: 26

## 2020-09-09 PROCEDURE — 96375 TX/PRO/DX INJ NEW DRUG ADDON: CPT

## 2020-09-09 PROCEDURE — 76705 ECHO EXAM OF ABDOMEN: CPT

## 2020-09-09 PROCEDURE — 36415 COLL VENOUS BLD VENIPUNCTURE: CPT

## 2020-09-09 PROCEDURE — 85379 FIBRIN DEGRADATION QUANT: CPT

## 2020-09-09 PROCEDURE — 81001 URINALYSIS AUTO W/SCOPE: CPT

## 2020-09-09 RX ORDER — ONDANSETRON 8 MG/1
4 TABLET, FILM COATED ORAL ONCE
Refills: 0 | Status: COMPLETED | OUTPATIENT
Start: 2020-09-09 | End: 2020-09-09

## 2020-09-09 RX ORDER — SODIUM CHLORIDE 9 MG/ML
1000 INJECTION INTRAMUSCULAR; INTRAVENOUS; SUBCUTANEOUS ONCE
Refills: 0 | Status: COMPLETED | OUTPATIENT
Start: 2020-09-09 | End: 2020-09-09

## 2020-09-09 RX ORDER — MORPHINE SULFATE 50 MG/1
4 CAPSULE, EXTENDED RELEASE ORAL ONCE
Refills: 0 | Status: DISCONTINUED | OUTPATIENT
Start: 2020-09-09 | End: 2020-09-09

## 2020-09-09 RX ADMIN — ONDANSETRON 4 MILLIGRAM(S): 8 TABLET, FILM COATED ORAL at 02:21

## 2020-09-09 RX ADMIN — SODIUM CHLORIDE 1000 MILLILITER(S): 9 INJECTION INTRAMUSCULAR; INTRAVENOUS; SUBCUTANEOUS at 02:20

## 2020-09-09 RX ADMIN — MORPHINE SULFATE 4 MILLIGRAM(S): 50 CAPSULE, EXTENDED RELEASE ORAL at 02:36

## 2020-09-09 RX ADMIN — MORPHINE SULFATE 4 MILLIGRAM(S): 50 CAPSULE, EXTENDED RELEASE ORAL at 02:21

## 2020-09-09 NOTE — ED PROVIDER NOTE - CARE PROVIDER_API CALL
Monica Soriano  INTERNAL MEDICINE  [address] [facility], [facility] [address]  [place] [address]  Phone: [phone]  Fax: [phone]  Follow Up Time:

## 2020-09-09 NOTE — ED PROVIDER NOTE - NSFOLLOWUPINSTRUCTIONS_ED_ALL_ED_FT
Please be sure to hydrate and keep your diet simple with plenty of liquids.  Follow up with your primary care doctor.  Return to the ER for persistent pain, vomiting, fever, diarrhea, shortness of breath, or any other concerns.

## 2020-09-09 NOTE — ED PROVIDER NOTE - PATIENT PORTAL LINK FT
You can access the FollowMyHealth Patient Portal offered by [facility] by registering at the following website: [email or web address]. By joining [facility]’s FollowMyHealth portal, you will also be able to view your health information using other applications (apps) compatible with our system.

## 2020-09-09 NOTE — ED PROVIDER NOTE - OBJECTIVE STATEMENT
29 year old female with no significant PMH presents with upper abdominal pain x 5 hours.  Patient states she was resting and suddenly developed sharp pain to her epigastric region associated with nausea and diarrhea x 3 episodes.  The pain occurred 1.5 hours after eating dinner.  She took 2 Tums without relief.  Denies fever, vomiting, chest pain.  The pain does not radiate.  It is associated with shortness of breath.  Patient gave birth  3 months ago to a healthy full term baby.  She is currently not nursing. No history of alcohol use or gallstones.  PMD Monica Soriano.

## 2020-09-09 NOTE — ED PROVIDER NOTE - CHPI ED SYMPTOMS NEG
no hematuria/no blood in stool/no vomiting/no abdominal distension/no burning urination/no dysuria/no fever/no chills

## 2020-09-09 NOTE — ED PROVIDER NOTE - CLINICAL SUMMARY MEDICAL DECISION MAKING FREE TEXT BOX
29 year old female p/w abdominal pain, nausea, and diarrhea x 5 hours shortly after eating. Labs, hydrate, analgesia, US gallbladder and consider CT

## 2020-09-09 NOTE — ED PROVIDER NOTE - PROGRESS NOTE DETAILS
Patient feeling much better.  Offered CT abd/pelvis but patient declining. States her pain is almost gone and she would like to go home.  Abd soft, NT/ND.  Will f/u with PMD

## 2020-09-09 NOTE — ED ADULT TRIAGE NOTE - CHIEF COMPLAINT QUOTE
sudden onset of upper abdominal pain x 5 hours with associated nausea and diarrhea   no sick contacts, gave birth three months ago

## 2020-10-01 NOTE — ED PROVIDER NOTE - TIMING
Spoke with patient's wife. Informed wife that labs are needed before patient can get refills. Wife ask that orders be faxed to [facility] at [phone]  jaida notified I difficulty getting fax to go through on yesterday but they did go through today and fax confirmation received. sudden onset
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
## 2021-12-06 ENCOUNTER — TRANSCRIPTION ENCOUNTER (OUTPATIENT)
Age: 31
End: 2021-12-06

## 2022-11-05 ENCOUNTER — NON-APPOINTMENT (OUTPATIENT)
Age: 32
End: 2022-11-05

## 2022-11-14 ENCOUNTER — OUTPATIENT (OUTPATIENT)
Dept: OUTPATIENT SERVICES | Facility: HOSPITAL | Age: 32
LOS: 1 days | End: 2022-11-14
Payer: COMMERCIAL

## 2022-11-14 VITALS
SYSTOLIC BLOOD PRESSURE: 138 MMHG | RESPIRATION RATE: 15 BRPM | HEART RATE: 82 BPM | TEMPERATURE: 98 F | WEIGHT: 275.36 LBS | HEIGHT: 66 IN | OXYGEN SATURATION: 97 % | DIASTOLIC BLOOD PRESSURE: 79 MMHG

## 2022-11-14 DIAGNOSIS — O02.1 MISSED ABORTION: ICD-10-CM

## 2022-11-14 DIAGNOSIS — Z87.81 PERSONAL HISTORY OF (HEALED) TRAUMATIC FRACTURE: Chronic | ICD-10-CM

## 2022-11-14 LAB
BLD GP AB SCN SERPL QL: NEGATIVE — SIGNIFICANT CHANGE UP
HCT VFR BLD CALC: 39.7 % — SIGNIFICANT CHANGE UP (ref 34.5–45)
HGB BLD-MCNC: 12.5 G/DL — SIGNIFICANT CHANGE UP (ref 11.5–15.5)
MCHC RBC-ENTMCNC: 26 PG — LOW (ref 27–34)
MCHC RBC-ENTMCNC: 31.5 GM/DL — LOW (ref 32–36)
MCV RBC AUTO: 82.7 FL — SIGNIFICANT CHANGE UP (ref 80–100)
NRBC # BLD: 0 /100 WBCS — SIGNIFICANT CHANGE UP (ref 0–0)
PLATELET # BLD AUTO: 250 K/UL — SIGNIFICANT CHANGE UP (ref 150–400)
RBC # BLD: 4.8 M/UL — SIGNIFICANT CHANGE UP (ref 3.8–5.2)
RBC # FLD: 15.2 % — HIGH (ref 10.3–14.5)
RH IG SCN BLD-IMP: POSITIVE — SIGNIFICANT CHANGE UP
WBC # BLD: 8.99 K/UL — SIGNIFICANT CHANGE UP (ref 3.8–10.5)
WBC # FLD AUTO: 8.99 K/UL — SIGNIFICANT CHANGE UP (ref 3.8–10.5)

## 2022-11-14 NOTE — H&P PST ADULT - HISTORY OF PRESENT ILLNESS
31 year old female , LMP 2022, PMH of Morbid Obesity (BMI 44.4), recent covid infection (tested (+) 2022, fever, chills, fatigue, headaches, cough) who presents with missed . She currently denies fever, chills, nausea/vomiting, vaginal bleeding or discharge. She is scheduled for D&C on 11/15/2022.

## 2022-11-14 NOTE — H&P PST ADULT - FALL HARM RISK - UNIVERSAL INTERVENTIONS
Bed in lowest position, wheels locked, appropriate side rails in place/Call bell, personal items and telephone in reach/Instruct patient to call for assistance before getting out of bed or chair/Non-slip footwear when patient is out of bed/[place] to call system/Physically safe environment - no spills, clutter or unnecessary equipment/Purposeful Proactive Rounding/Room/bathroom lighting operational, light cord in reach

## 2022-11-14 NOTE — H&P PST ADULT - PROBLEM SELECTOR PLAN 1
D&C  -cbc, type and screen drawn at PST  -preop instructions provided  -ABO on admit D&C  -cbc, type and screen drawn at PST  -preop instructions provided  -BMI > 40; OR booking notified  -ABO on admit

## 2022-11-14 NOTE — H&P PST ADULT - NSICDXPASTMEDICALHX_GEN_ALL_CORE_FT
PAST MEDICAL HISTORY:  COVID-19 virus infection -tested positive 11/4/2022 (fever, chills, cough, headaches, fatigue, loss of sense of taste smell)    Obese -BMI 44.4    Vaginal delivery -may 2020

## 2022-11-15 ENCOUNTER — TRANSCRIPTION ENCOUNTER (OUTPATIENT)
Age: 32
End: 2022-11-15

## 2022-11-15 ENCOUNTER — RESULT REVIEW (OUTPATIENT)
Age: 32
End: 2022-11-15

## 2022-11-15 ENCOUNTER — INPATIENT (INPATIENT)
Facility: HOSPITAL | Age: 32
LOS: 0 days | Discharge: HOME | DRG: 779 | End: 2022-11-16
Attending: OBSTETRICS & GYNECOLOGY | Admitting: OBSTETRICS & GYNECOLOGY
Payer: COMMERCIAL

## 2022-11-15 VITALS
SYSTOLIC BLOOD PRESSURE: 130 MMHG | DIASTOLIC BLOOD PRESSURE: 90 MMHG | HEART RATE: 80 BPM | HEIGHT: 66 IN | TEMPERATURE: 98 F | WEIGHT: 276.9 LBS | RESPIRATION RATE: 16 BRPM

## 2022-11-15 DIAGNOSIS — O02.1 MISSED ABORTION: ICD-10-CM

## 2022-11-15 DIAGNOSIS — Z87.81 PERSONAL HISTORY OF (HEALED) TRAUMATIC FRACTURE: Chronic | ICD-10-CM

## 2022-11-15 PROCEDURE — 86900 BLOOD TYPING SEROLOGIC ABO: CPT

## 2022-11-15 PROCEDURE — 85027 COMPLETE CBC AUTOMATED: CPT

## 2022-11-15 PROCEDURE — 71045 X-RAY EXAM CHEST 1 VIEW: CPT | Mod: 26

## 2022-11-15 PROCEDURE — G0463: CPT

## 2022-11-15 PROCEDURE — 86901 BLOOD TYPING SEROLOGIC RH(D): CPT

## 2022-11-15 PROCEDURE — 36415 COLL VENOUS BLD VENIPUNCTURE: CPT

## 2022-11-15 PROCEDURE — 86850 RBC ANTIBODY SCREEN: CPT

## 2022-11-15 RX ORDER — SODIUM CHLORIDE 9 MG/ML
3 INJECTION INTRAMUSCULAR; INTRAVENOUS; SUBCUTANEOUS EVERY 8 HOURS
Refills: 0 | Status: DISCONTINUED | OUTPATIENT
Start: 2022-11-15 | End: 2022-11-16

## 2022-11-15 RX ORDER — LIDOCAINE HCL 20 MG/ML
0.2 VIAL (ML) INJECTION ONCE
Refills: 0 | Status: DISCONTINUED | OUTPATIENT
Start: 2022-11-15 | End: 2022-11-16

## 2022-11-15 NOTE — CONSULT NOTE ADULT - ATTENDING COMMENTS
31 year old female , LMP 2022, PMH of Morbid Obesity (BMI 44.4), recent covid infection (tested (+) 2022, fever, chills, fatigue, headaches, cough) who presents with missed . She currently denies fever, chills, nausea/vomiting, vaginal bleeding or discharge.     Medicine consulted for preoperative clearance and for COVID-19    Patient seen and examined at bedside- in no acute distress, complains of dry cough however denies, fever, chills, SOB, MCCLURE   VS: BP: 147/81, HR: 81, RR:16, Saturating 98% on RA.   My exam: lungs clear to auscultation, ambulatory sat- 96% post ambulating   No pertinent labs   CXR personally reviewed- no acute infiltrates or congestion     Plan    #COVID 19   Patient is saturating well on room air, speaking in full sentences  -Does not meet criteria for inpatient tx w remdesivir or steroids at this time  -Ambulatory saturation- no need to check COVID inflammatory markers   -Would check w epidemiology as patient tested positive 10 days ago, the need for airborne precaution   -Monitor O2 saturation   -Symptomatic tx with antitussives and albuterol PRN- most likely patient is exerpiencing post viral bronchitis     #Preoperative Clearance for D&C  Patient is low risk for a low risk procedure- RCRI and Rust score as above   -Patient is cleared from a medical standpoint and does not need inpatient tx for COVID 19   -Keep patient NPO   -Please start maintenance fluids and check FSG [id number] while NPO, start D5 if patient FSG downtrending   -DVT ppx and pain control as per primary team  -Ensure patient is on bowel regimen if on opiates

## 2022-11-15 NOTE — ASU PREOP CHECKLIST - ISOLATION PRECAUTIONS
This [facility] spoke with pt and pt stated that she is doing well. Patient denied any worsening symptoms. Denied fever, chills, N/V and any difficulty breathing at this time. Pt does have a cough but not worsening. . Pt had a f/u with PCP and it went well. Pt eating well and staying well hydrated, moving about the home to gain energy. Denied chest pain and SOB. Denied difficulty with urination, BMs or appetite. Denied any needs and concerns at this time. Advised pt to immediately report any worsening symptoms to the PCP. Patient verbalized understanding and agreed. Severiano Goldsmith LPN, [facility]  PH: [address] Transitions Follow Up Call    8/3/2022    Patient: Marlyn Contreras  Patient : 1950   MRN: [id number]  Reason for Admission: Covid  Discharge Date: 22 RARS: Readmission Risk Score: 12.5         Spoke with: [address] Transitions Follow Up Call    Needs to be reviewed by the provider   Additional needs identified to be addressed with provider: No  home health care-[facility].- SN, PT and OT             Method of communication with provider : none      LPN Care Coordinator contacted the patient by telephone to follow up after admission on 22. Verified name and  with patient as identifiers. Addressed changes since last contact:  HFU with PCP  Discussed follow-up appointments. If no appointment was previously scheduled, appointment scheduling offered: Yes. Is follow up appointment scheduled within 7 days of discharge? Yes. Advance Care Planning:   Does patient have an Advance Directive: not on file. LPN CC reviewed discharge instructions, medical action plan and red flags with patient and discussed any barriers to care and/or understanding of plan of care after discharge. Discussed appropriate site of care based on symptoms and resources available to patient including: PCP  Specialist  Urgent care clinics  Home health  When to call 911.  The patient agrees to contact the PCP office for questions related to their healthcare. Patients top risk factors for readmission: ineffective coping  Interventions to address risk factors: Obtained and reviewed discharge summary and/or continuity of care documents      Non-[facility] follow up appointment(s):     LPN CC provided contact information for future needs. Plan for follow-up call in 5-7 days based on severity of symptoms and risk factors. Plan for next call: self management-Hydration, Cough          Care Transitions Subsequent and Final Call    Schedule Follow Up Appointment with PCP: Completed  Subsequent and Final Calls  Do you have any ongoing symptoms?: Yes  Onset of Patient-reported symptoms: Other  Patient-reported symptoms: Cough  Interventions for patient-reported symptoms: Other  Have your medications changed?: No  Do you have any questions related to your medications?: No  Do you currently have any active services?: Yes  Are you currently active with any services?: Home Health  Do you have any needs or concerns that I can assist you with?: No  Identified Barriers: None  Care Transitions Interventions   Home Care Waiver: Completed Physical Therapy: Completed Other Services: Completed      DME Assistance: Completed    Occupational Therapy: Completed     Other Interventions:              Follow Up  Future Appointments   Date Time Provider Jatin Hughes   8/10/2022  9:00 AM Joseph Andrade MD [address] [facility] JENNIFER   8/24/2022  9:00 AM Joseph Andrade MD [address] [place], [place] none

## 2022-11-15 NOTE — CONSULT NOTE ADULT - ASSESSMENT
31 year old female , LMP 2022, PMH of Morbid Obesity (BMI 44.4), recent covid infection (tested (+) 2022, fever, chills, fatigue, headaches, cough) who presents with missed . She currently denies fever, chills, nausea/vomiting, vaginal bleeding or discharge.     Internal medicine was consulted to determine how to manage patient in setting of being incidentally found to be COVID positive, for management of COVID infection during and after D&C. Patient is on room air. She is scheduled for D&C non-emergently, which will require intubation and anesthesia.     PLAN:  - She is no longer within 7 days of diagnosis, requiring remdesivir  - She is not requiring O2 at baseline, so normally would not meet criteria for starting decadron 6 mg x 10 days. However, she will be intubated, and therefore decadron may be indicated.  - Recommend obtaining a D-dimer. If D-dimer is > 2 ULN consider starting full AC when cleared by surgery  - Would recommend monitoring patient for 1 day post-procedure in the hospital to assess oxygen requirements post-intubation - obtain ABG post-intubation    Patient discussed with ???      ***************************************************************  Aaliyah Godinez, PGY3  Internal Medicine   pager: NS: [phone] [facility]: [phone]  ***************************************************************       31 year old female , LMP 2022, PMH of Morbid Obesity (BMI 44.4), recent covid infection (tested (+) 2022, fever, chills, fatigue, headaches, cough) who presents with missed . She currently denies fever, chills, nausea/vomiting, vaginal bleeding or discharge.     Internal medicine was consulted to determine how to manage patient in setting of being incidentally found to be COVID positive, for management of COVID infection during and after D&C. Patient is on room air. She is scheduled for D&C non-emergently, which will require intubation and anesthesia.     PLAN:  - She is no longer within 7 days of diagnosis, requiring remdesivir  - She is not requiring O2 at baseline, so normally would not meet criteria for starting decadron 6 mg x 10 days. However, she will be intubated, and therefore decadron may be indicated.  - Recommend obtaining a D-dimer. If D-dimer is > 2 ULN consider starting full AC when cleared by surgery  - Would recommend monitoring patient for 1 day post-procedure in the hospital to assess oxygen requirements post-intubation - obtain ABG post-intubation  - Recommend obtaining ABG to assess oxygenation status  - Recommend maintenance fluids @ 75cc/hr while NPO    Patient discussed with ???      ***************************************************************  Aaliyah Godinez, PGY3  Internal Medicine   pager: NS: [phone] [facility]: [phone]  ***************************************************************       31 year old female , LMP 2022, PMH of Morbid Obesity (BMI 44.4), recent covid infection (tested (+) 2022, fever, chills, fatigue, headaches, cough) who presents with missed . She currently denies fever, chills, nausea/vomiting, vaginal bleeding or discharge.     Internal medicine was consulted to determine how to manage patient in setting of being incidentally found to be COVID positive, for management of COVID infection during and after D&C. Patient is on room air. She is scheduled for D&C non-emergently, which will require intubation and anesthesia.     PLAN:  - She is no longer within 7 days of diagnosis, requiring remdesivir  - She is not requiring O2 at baseline, so normally would not meet criteria for starting decadron 6 mg x 10 days.   - Recommend obtaining a D-dimer, ferritin, LDH, CRP.   - Would recommend monitoring patient for 1 day post-procedure in the hospital to assess oxygen requirements post-intubation - obtain ABG post-intubation  - Recommend maintenance fluids @ 75cc/hr while NPO  - Check ambulatory sat, and if desats to < 91% get ABG  - Tessalon perles Q8h, robitussin PRN, albuterol PRN  - RCRI 0 points, and GARCIA 0.0% ÁNGEL Risk  - METS > 4  - Patient is medically optimized (if does not desaturate <91% with ambulatory saturation)  - No family history of diabetes, HTN, cancer  - Pain control, per primary team  - DVT ppx as per primary team  - Monitor post-procedure per anesthesia, no need to change protocol for COVID infection    Patient discussed with Dr. Connor      ***************************************************************  Aaliyah Godinez, PGY3  Internal Medicine   pager: NS: [phone] [facility]: [phone]  ***************************************************************       31 year old female , LMP 2022, PMH of Morbid Obesity (BMI 44.4), recent covid infection (tested (+) 2022, fever, chills, fatigue, headaches, cough) who presents with missed . She currently denies fever, chills, nausea/vomiting, vaginal bleeding or discharge.     Internal medicine was consulted to determine how to manage patient in setting of being incidentally found to be COVID positive, for management of COVID infection during and after D&C. Patient is on room air. She is scheduled for D&C non-emergently, which will require intubation and anesthesia.     PLAN:  - She is no longer within 7 days of diagnosis, requiring remdesivir  - She is not requiring O2 at baseline, so does not meet criteria for starting decadron 6 mg x 10 days.   - Recommend maintenance fluids @ 75cc/hr while NPO  - Ambulatory saturation was 96%  - Tessalon perles Q8h, robitussin PRN, albuterol PRN  - RCRI 0 points, and GARCIA 0.0% ÁNGEL Risk  - METS > 4  - Patient is medically optimized for D&C  - No family history of diabetes, HTN, cancer  - Pain control, per primary team  - DVT ppx as per primary team  - Monitor post-procedure per anesthesia, no need to change protocol for COVID infection    Patient discussed with Dr. Connor      ***************************************************************  Aaliyah Godinez, PGY3  Internal Medicine   pager: NS: [phone] [facility]: [phone]  ***************************************************************

## 2022-11-15 NOTE — CONSULT NOTE ADULT - SUBJECTIVE AND OBJECTIVE BOX
BHAVANA BOLAND  31y  Female      31 year old female , LMP 2022, PMH of Morbid Obesity (BMI 44.4), recent covid infection (tested (+) 2022, fever, chills, fatigue, headaches, cough) who presents with missed . She currently denies fever, chills, nausea/vomiting, vaginal bleeding or discharge. She is on room air. She is scheduled for D&C non-emergently.      PAST MEDICAL/SURGICAL HISTORY  PAST MEDICAL & SURGICAL HISTORY:  Obese  -BMI 44.4      Vaginal delivery  -may 2020      COVID-19 virus infection  -tested positive 2022 (fever, chills, cough, headaches, fatigue, loss of sense of taste smell)      H/O fracture of arm  -right. S/P repair    Social History:  Never smoker, denies ETOH use. . Has one child.    Allergies:        Allergies:  	penicillin: Drug, Rash    Home Medications:   * No Current Medications as of 2022 19:27 documented in Structured Notes        REVIEW OF SYSTEMS:  CONSTITUTIONAL: No fever, weight loss, or fatigue  EYES: No eye pain, visual disturbances, or discharge  ENMT:  No difficulty hearing, tinnitus, vertigo; No sinus or throat pain  NECK: No pain or stiffness  BREASTS: No pain, masses, or nipple discharge  RESPIRATORY: No cough, wheezing, chills or hemoptysis; No shortness of breath  CARDIOVASCULAR: No chest pain, palpitations, dizziness, or leg swelling  GASTROINTESTINAL: No abdominal or epigastric pain. No nausea, vomiting, or hematemesis; No diarrhea or constipation. No melena or hematochezia.  GENITOURINARY: No dysuria, frequency, hematuria, or incontinence  NEUROLOGICAL: No headaches, memory loss, loss of strength, numbness, or tremors  SKIN: No itching, burning, rashes, or lesions   LYMPH NODES: No enlarged glands  ENDOCRINE: No heat or cold intolerance; No hair loss  MUSCULOSKELETAL: No joint pain or swelling; No muscle, back, or extremity pain  PSYCHIATRIC: No depression, anxiety, mood swings, or difficulty sleeping  HEME/LYMPH: No easy bruising, or bleeding gums  ALLERY AND IMMUNOLOGIC: No hives or eczema    T(C): 36.4 (11-15-22 @ 19:35), Max: 36.4 (11-15-22 @ 19:35)  HR: 80 (11-15-22 @ 19:35) (80 - 80)  BP: 130/90 (11-15-22 @ 19:35) (130/90 - 130/90)  RR: 16 (11-15-22 @ 19:35) (16 - 16)  SpO2: --  Wt(kg): --Vital Signs Last 24 Hrs  T(C): 36.4 (15 Nov 2022 19:35), Max: 36.4 (15 Nov 2022 19:35)  T(F): 97.5 (15 Nov 2022 19:35), Max: 97.5 (15 Nov 2022 19:35)  HR: 80 (15 Nov 2022 19:35) (80 - 80)  BP: 130/90 (15 Nov 2022 19:35) (130/90 - 130/90)  BP(mean): --  RR: 16 (15 Nov 2022 19:35) (16 - 16)  SpO2: --        PHYSICAL EXAM:  GENERAL: NAD, well-groomed, well-developed  HEAD:  Atraumatic, Normocephalic  EYES: EOMI, PERRLA, conjunctiva and sclera clear  ENMT: No tonsillar erythema, exudates, or enlargement; Moist mucous membranes, Good dentition, No lesions  NECK: Supple, No JVD, Normal thyroid  NERVOUS SYSTEM:  Alert & Oriented X3, Good concentration; Motor Strength 5/5 B/L upper and lower extremities; DTRs 2+ intact and symmetric  CHEST/LUNG: Clear to percussion bilaterally; No rales, rhonchi, wheezing, or rubs  HEART: Regular rate and rhythm; No murmurs, rubs, or gallops  ABDOMEN: Soft, Nontender, Nondistended; Bowel sounds present  EXTREMITIES:  2+ Peripheral Pulses, No clubbing, cyanosis, or edema  LYMPH: No lymphadenopathy noted  SKIN: No rashes or lesions    Consultant(s) Notes Reviewed:  [x ] YES  [ ] NO  Care Discussed with Consultants/Other Providers [ x] YES  [ ] NO    LABS:  CBC       BMP      CMP      PT/INR      Amylase/Lipase            RADIOLOGY & ADDITIONAL TESTS:    Imaging Personally Reviewed:  [ ] YES  [ ] NO BHAVANA BOLAND  31y  Female      31 year old female , LMP 2022, PMH of Morbid Obesity (BMI 44.4), recent covid infection (tested (+) 2022, fever, chills, fatigue, headaches, cough) who presents with missed . She currently denies fever, chills, nausea/vomiting, vaginal bleeding or discharge. She is on room air. She is scheduled for D&C non-emergently.      PAST MEDICAL/SURGICAL HISTORY  PAST MEDICAL & SURGICAL HISTORY:  Obese  -BMI 44.4      Vaginal delivery  -may 2020      COVID-19 virus infection  -tested positive 2022 (fever, chills, cough, headaches, fatigue, loss of sense of taste smell)      H/O fracture of arm  -right. S/P repair    Social History:  Never smoker, denies ETOH use. , lives with  and son. Has one child.    Allergies:        Allergies:  	penicillin: Drug, Rash    Home Medications:   * No Current Medications as of 2022 19:27 documented in Structured Notes        REVIEW OF SYSTEMS:  CONSTITUTIONAL: No fever, weight loss, or fatigue  EYES: No eye pain, visual disturbances, or discharge  ENMT:  No difficulty hearing, tinnitus, vertigo; No sinus or throat pain  NECK: No pain or stiffness  BREASTS: No pain, masses, or nipple discharge  RESPIRATORY: (+) cough; No wheezing, chills or hemoptysis; No shortness of breath  CARDIOVASCULAR: No chest pain, palpitations, dizziness, or leg swelling  GASTROINTESTINAL: No abdominal or epigastric pain. (+) nausea; no vomiting, or hematemesis; No diarrhea; (+) constipation. No melena or hematochezia.  GENITOURINARY: No dysuria, frequency, hematuria, or incontinence  NEUROLOGICAL: No headaches, memory loss, loss of strength, numbness, or tremors  SKIN: No itching, burning, rashes, or lesions   LYMPH NODES: No enlarged glands  ENDOCRINE: No heat or cold intolerance; No hair loss  MUSCULOSKELETAL: No joint pain or swelling; No muscle, back, or extremity pain  PSYCHIATRIC: No depression, anxiety, mood swings, or difficulty sleeping  HEME/LYMPH: No easy bruising, or bleeding gums  ALLERY AND IMMUNOLOGIC: No hives or eczema    T(C): 36.4 (11-15-22 @ 19:35), Max: 36.4 (11-15-22 @ 19:35)  HR: 80 (11-15-22 @ 19:35) (80 - 80)  BP: 130/90 (11-15-22 @ 19:35) (130/90 - 130/90)  RR: 16 (11-15-22 @ 19:35) (16 - 16)  SpO2: --  Wt(kg): --Vital Signs Last 24 Hrs  T(C): 36.4 (15 Nov 2022 19:35), Max: 36.4 (15 Nov 2022 19:35)  T(F): 97.5 (15 Nov 2022 19:35), Max: 97.5 (15 Nov 2022 19:35)  HR: 80 (15 Nov 2022 19:35) (80 - 80)  BP: 130/90 (15 Nov 2022 19:35) (130/90 - 130/90)  BP(mean): --  RR: 16 (15 Nov 2022 19:35) (16 - 16)  SpO2: --        PHYSICAL EXAM:  GENERAL: NAD, well-groomed, well-developed  HEAD:  Atraumatic, Normocephalic  EYES: EOMI, PERRLA, conjunctiva and sclera clear  ENMT: No tonsillar erythema, exudates, or enlargement; Moist mucous membranes, Good dentition, No lesions  NECK: Supple, No JVD, Normal thyroid  NERVOUS SYSTEM:  Alert & Oriented X3, Good concentration; Motor Strength 5/5 B/L upper and lower extremities; DTRs 2+ intact and symmetric  CHEST/LUNG: Clear to percussion bilaterally; No rales, rhonchi, wheezing, or rubs, (+) cough, speaking in full sentences  HEART: Regular rate and rhythm; No murmurs, rubs, or gallops  ABDOMEN: Soft, Nontender, Nondistended; Bowel sounds present  EXTREMITIES:  2+ Peripheral Pulses, No clubbing, cyanosis, or edema  LYMPH: No lymphadenopathy noted  SKIN: No rashes or lesions    Consultant(s) Notes Reviewed:  [x ] YES  [ ] NO  Care Discussed with Consultants/Other Providers [ x] YES  [ ] NO    LABS:  CBC       BMP      CMP      PT/INR      Amylase/Lipase            RADIOLOGY & ADDITIONAL TESTS:    Imaging Personally Reviewed:  [ ] YES  [ ] NO BHAVANA BOLAND  31y  Female      31 year old female , LMP 2022, PMH of Morbid Obesity (BMI 44.4), recent covid infection (tested (+) 2022, fever, chills, fatigue, headaches, cough) who presents with missed . She has had two Pfizer vaccinations, last one administered 10/2021. She has a dry cough, and nausea, and currently denies fever, chills, vomiting, vaginal bleeding or discharge. She is on room air. She is scheduled for D&C non-emergently.       Past Medical History:  Obese  -BMI 44.4      Past Surgical History:  Vaginal delivery  -may 2020    H/O fracture of arm  -right. S/P repair     Social History:  Never smoker, denies ETOH use. , lives with  and son. Has one child.    Allergies:        Allergies:  	penicillin: Drug, Rash, Hives    Home Medications:   No Current Medications         REVIEW OF SYSTEMS:  CONSTITUTIONAL: No fever, weight loss, or fatigue  EYES: No eye pain, visual disturbances, or discharge  ENMT:  No difficulty hearing, tinnitus, vertigo; No sinus or throat pain  NECK: No pain or stiffness  BREASTS: No pain, masses, or nipple discharge  RESPIRATORY: (+) cough; No wheezing, chills or hemoptysis; No shortness of breath  CARDIOVASCULAR: No chest pain, palpitations, dizziness, or leg swelling  GASTROINTESTINAL: No abdominal or epigastric pain. (+) nausea; no vomiting, or hematemesis; No diarrhea; (+) constipation. No melena or hematochezia.  GENITOURINARY: No dysuria, frequency, hematuria, or incontinence  NEUROLOGICAL: No headaches, memory loss, loss of strength, numbness, or tremors  SKIN: No itching, burning, rashes, or lesions   LYMPH NODES: No enlarged glands  ENDOCRINE: No heat or cold intolerance; No hair loss  MUSCULOSKELETAL: No joint pain or swelling; No muscle, back, or extremity pain  PSYCHIATRIC: No depression, anxiety, mood swings, or difficulty sleeping  HEME/LYMPH: No easy bruising, or bleeding gums  ALLERY AND IMMUNOLOGIC: No hives or eczema    T(C): 36.4 (11-15-22 @ 19:35), Max: 36.4 (11-15-22 @ 19:35)  HR: 80 (11-15-22 @ 19:35) (80 - 80)  BP: 130/90 (11-15-22 @ 19:35) (130/90 - 130/90)  RR: 16 (11-15-22 @ 19:35) (16 - 16)  SpO2: --  Wt(kg): --Vital Signs Last 24 Hrs  T(C): 36.4 (15 Nov 2022 19:35), Max: 36.4 (15 Nov 2022 19:35)  T(F): 97.5 (15 Nov 2022 19:35), Max: 97.5 (15 Nov 2022 19:35)  HR: 80 (15 Nov 2022 19:35) (80 - 80)  BP: 130/90 (15 Nov 2022 19:35) (130/90 - 130/90)  BP(mean): --  RR: 16 (15 Nov 2022 19:35) (16 - 16)  SpO2: --        PHYSICAL EXAM:  GENERAL: NAD, well-groomed, well-developed  HEAD:  Atraumatic, Normocephalic  EYES: EOMI, PERRLA, conjunctiva and sclera clear  ENMT: No tonsillar erythema, exudates, or enlargement; Moist mucous membranes, Good dentition, No lesions  NECK: Supple, No JVD, Normal thyroid  NERVOUS SYSTEM:  Alert & Oriented X3, Good concentration; Motor Strength 5/5 B/L upper and lower extremities; DTRs 2+ intact and symmetric  CHEST/LUNG: Clear to percussion bilaterally; No rales, rhonchi, wheezing, or rubs, (+) cough, speaking in full sentences  HEART: Regular rate and rhythm; No murmurs, rubs, or gallops  ABDOMEN: Soft, Nontender, Nondistended; Bowel sounds present  EXTREMITIES:  2+ Peripheral Pulses, No clubbing, cyanosis, or edema  LYMPH: No lymphadenopathy noted  SKIN: No rashes or lesions    Consultant(s) Notes Reviewed:  [x ] YES  [ ] NO  Care Discussed with Consultants/Other Providers [ x] YES  [ ] NO    LABS:  CBC       BMP      CMP      PT/INR      Amylase/Lipase            RADIOLOGY & ADDITIONAL TESTS:    Imaging Personally Reviewed:  [ ] YES  [ ] NO

## 2022-11-15 NOTE — CHART NOTE - NSCHARTNOTEFT_GEN_A_CORE
Patient seen at bedside.     Reports a positive COVID-19 PCR from [facility] near her home. Result pulled up on patients phone.   Patient reports that she is still symptomatic with a persistent dry cough triggered by deep inspiration and laying flat.   Patient denies fevers/ chills, shortness of breath, orthopnea, dyspnea, nasal drainage, or headache.  Patient denies any history of asthma or COPD. Does not take any pulmonary medications.     Able to speak in full sentences with no labored breathing.    Lungs are clear to ascultation. Able to tolerate laying flat.   RRR w/ +2 peripheral pulses.     DEDE Kim (Anesthesiology). Requesting medicine evaluation prior to operation for medical clearance, evaluation for need of medical optimization, and post anesthesia admission. Recommended obtaining CXR prior to GETA for baseline assessment.   Consult placed with MAR on call.    DW: Dr. Sherley Littlejohn, PGY-2 Patient seen at bedside.     Reports a positive COVID-19 PCR from [facility] near her home. Result pulled up on patients phone.   Patient reports that she is still symptomatic with a persistent dry cough triggered by deep inspiration and laying flat.   Patient denies fevers/ chills, shortness of breath, orthopnea, dyspnea, nasal drainage, or headache.  Patient denies any history of asthma or COPD. Does not take any pulmonary medications.     Able to speak in full sentences with no labored breathing.    Lungs are clear to ascultation. Able to tolerate laying flat.   RRR w/ +2 peripheral pulses.     DW Dr. Kim (Anesthesiology). Requesting medicine evaluation prior to operation for medical clearance, evaluation for need of medical optimization, and post anesthesia admission. Recommended obtaining CXR prior to GETA for baseline assessment.   Consult placed with MAR on call.    DW: Dr. Sherley Littlejohn, PGY-2    addendum  pt seen and examined by me  agree w above  medical consult requested by anesthesia (dr ibarra) given BMI and persistent cough past 10d covid infection, spoke to medicine attending and dr ibarra, pt medically cleared for surgery, post op management per protocol and discharge home once cleared surgically.  if pt requires medical care for covid post op then reconsult medicine for transfer to their team and further management.   pt understands and agrees to proceed  r/b/a discussed, all questions answered, consents obtained  awaiting OR  callphyliciao

## 2022-11-15 NOTE — ASU PATIENT PROFILE, ADULT - FALL HARM RISK - UNIVERSAL INTERVENTIONS
Bed in lowest position, wheels locked, appropriate side rails in place/Call bell, personal items and telephone in reach/Instruct patient to call for assistance before getting out of bed or chair/Non-slip footwear when patient is out of bed/[place] to call system/Physically safe environment - no spills, clutter or unnecessary equipment/Purposeful Proactive Rounding/Room/bathroom lighting operational, light cord in reach

## 2022-11-16 VITALS
RESPIRATION RATE: 16 BRPM | HEART RATE: 96 BPM | OXYGEN SATURATION: 97 % | SYSTOLIC BLOOD PRESSURE: 139 MMHG | DIASTOLIC BLOOD PRESSURE: 71 MMHG

## 2022-11-16 LAB — D DIMER BLD IA.RAPID-MCNC: <150 NG/ML DDU — SIGNIFICANT CHANGE UP

## 2022-11-16 PROCEDURE — 88280 CHROMOSOME KARYOTYPE STUDY: CPT

## 2022-11-16 PROCEDURE — 36415 COLL VENOUS BLD VENIPUNCTURE: CPT

## 2022-11-16 PROCEDURE — 88305 TISSUE EXAM BY PATHOLOGIST: CPT | Mod: 26

## 2022-11-16 PROCEDURE — C9399: CPT

## 2022-11-16 PROCEDURE — 99223 1ST HOSP IP/OBS HIGH 75: CPT

## 2022-11-16 PROCEDURE — 85379 FIBRIN DEGRADATION QUANT: CPT

## 2022-11-16 PROCEDURE — 88264 CHROMOSOME ANALYSIS 20-25: CPT

## 2022-11-16 PROCEDURE — 88305 TISSUE EXAM BY PATHOLOGIST: CPT

## 2022-11-16 PROCEDURE — 71045 X-RAY EXAM CHEST 1 VIEW: CPT

## 2022-11-16 PROCEDURE — 88233 TISSUE CULTURE SKIN/BIOPSY: CPT

## 2022-11-16 RX ORDER — FENTANYL CITRATE 50 UG/ML
50 INJECTION INTRAVENOUS
Refills: 0 | Status: DISCONTINUED | OUTPATIENT
Start: 2022-11-16 | End: 2022-11-16

## 2022-11-16 RX ORDER — FENTANYL CITRATE 50 UG/ML
25 INJECTION INTRAVENOUS
Refills: 0 | Status: DISCONTINUED | OUTPATIENT
Start: 2022-11-16 | End: 2022-11-16

## 2022-11-16 RX ORDER — ONDANSETRON 8 MG/1
4 TABLET, FILM COATED ORAL ONCE
Refills: 0 | Status: DISCONTINUED | OUTPATIENT
Start: 2022-11-16 | End: 2022-11-16

## 2022-11-16 RX ADMIN — FENTANYL CITRATE 25 MICROGRAM(S): 50 INJECTION INTRAVENOUS at 06:00

## 2022-11-16 RX ADMIN — FENTANYL CITRATE 25 MICROGRAM(S): 50 INJECTION INTRAVENOUS at 05:42

## 2022-11-16 RX ADMIN — Medication 100 MILLIGRAM(S): at 04:00

## 2022-11-16 NOTE — BRIEF OPERATIVE NOTE - OPERATION/FINDINGS
Exam under anesthesia demonstrated normal external genitalia, anteverted with a 9w uterus. Cervix visibly dilated 1cm with no active bleeding.   Products of conception consistent with gestational age. Excellent hemostasis obtained.

## 2022-11-16 NOTE — BRIEF OPERATIVE NOTE - NSICDXBRIEFPROCEDURE_GEN_ALL_CORE_FT
PROCEDURES:  D&C, therapeutic, for incomp, missed, septic, or induced , 1st trimester 2022 03:46:36  Nora Littlejohn

## 2022-11-16 NOTE — ASU DISCHARGE PLAN (ADULT/PEDIATRIC) - CARE PROVIDER_API CALL
Tre Arambula)  Medicine  Critical Care  [address] [facility] [address] [facility] [place] [address]  Phone: [phone]  Fax: [phone]  Follow Up Time: 2 weeks

## 2022-11-16 NOTE — PRE-ANESTHESIA EVALUATION ADULT - HEIGHT IN INCHES
Transplant Surgery Progress Note    Date: 6/21/2018    Subjective:     HPI: Anabel Weldon is a 47 year old  female with a H/O ESRD due to FSGS S/P DDKT 5/26/18 (with Thymoglobulin induction). Patient's post transplant course significant for delayed allograft function (last HD 6/13/18), wound infection/dehiscence S/P vac placement, concern for urethral stricture requiring sadler replacement, and concern for urine leak. Of note, UC obtained 6/18/18 grew 60,000-100,000 CFU/mL Enterococcus faecium (Cipro started outpatient 6/19/18).      PMH further significant for anxiety, anemia of chronic disease, HLD, HTN, PE (due to permcath), and pancreatic lesion S/P EUS with bx (pathology noted extremely hypocellular specimen compatible cyst contents/gastrointestinal contamination).      Patient directly admitted to [facility] 6/20/18 with an elevated creatinine. CT cystogram ordered and ID/Nephrology/Urology consulted.      On admission, patient reports MA and LE edema. Patient denied any other complaints.     24 Hour Events:  6/21/18: HD. Vac d/c'd for biopsy tomorrow.    Past Medical History:   Diagnosis Date   • Acute bronchitis    • Anemia     Anemia of chronic disease   • Anesthesia complication     Difficult time waking up from general anesthesia   • Anxiety     Panic attacks (needs something to relax her prior to going into procedure room)   • Cardiomegaly    • Dialysis patient (CMS/[facility]) 09/2010        • ESRD (end stage renal disease) (CMS/[facility])     on dialysis 3x weekly - [facility] - [address] [place]  [address]   • Focal segmental glomerulosclerosis    • Hyperlipidemia    • Normal delivery     X 5   • Peripelvic (lymphatic) cyst     right upper renal pole   • Peripheral neuropathy    • Peritoneal dialysis status (CMS/[facility]) 08/2016   • Personal history of PE (pulmonary embolism) 2015    Treated  in [facility]   • Serous cystadenoma     pancreatic   • Wears glasses    • Wears partial dentures           Physical Exam:    Visit Vitals  /82 (BP Location: RFA, Patient Position: Sitting)   Pulse 75   Temp 98.3 °F (36.8 °C) (Oral)   Resp 18   Ht 5' 1\" (1.549 m)   Wt 101.8 kg   SpO2 100%   BMI 42.42 kg/m²       General Appearance: Alert, cooperative, no distress, appears stated age.   Head: Normocephalic, without obvious abnormality, atraumatic.   Eyes: Sclera anicteric bilaterally, EOM's intact bilateral.   Neck: Supple, no JVD, trachea midline.   Lungs: Clear to auscultation bilaterally, respirations unlabored.   Heart: Regular rate and rhythm, S1 and S2 normal, no murmur, rub or gallop.   Abdomen: Soft, obese, non-tender, bowel sounds active all four quadrants, no masses, no appreciable organomegaly. Vac applied to RLQ.  Extremities: No discolorations, no cyanosis, +2 peripheral edema.   Pulses: 2+ and symmetric all extremities.   Skin: Skin color, texture and turgor normal, no rashes.   Psychiatric: Alert & oriented x3. Mood and affect appropriate.   Neurologic: No focal neuro deficits, normal strength and sensation throughout.     Diagnostics:  6/13/18 CT Abdomen/Pelvis (without contrast)   1.  Stable size of a right pelvic hematoma, measuring 5.4 cm in maximum dimension. However, there is increasing adjacent water density fluid. The appearance raises the possibility of a urine leak.  2.  New air surrounding the catheter tract from the transplant kidney to the bladder, may be iatrogenic in nature. No convincing evidence for abscess formation.  3.  Stable appearance of cystic and centrally calcified mass within the pancreatic body.  4.  Soft tissue anasarca.  5.  Unchanged consolidation within the medial right lower lobe, which may represent atelectasis or pneumonia. Unchanged small-to-moderate sized right pleural effusion.  6.  Mild interval decrease in size of now trace left pleural effusion with adjacent atelectasis.  7.  Mild cardiomegaly with trace pericardial effusion.  6/18/18  Renal Transplant  Right  1.  Stable appearance of the right lower quadrant transplant kidney. No evidence of hydronephrosis or nephrolithiasis.   2.  Complex fluid collection within the right hemipelvis is redemonstrated and appears to have increased in size compared to CT 6/13/2018.  3.  Mildly elevated renal resistive indices which have mildly decreased from prior except in the lower pole where it has increased. Attention on follow-up is recommended.  4.  Patent renal veins and iliac arteries/veins.   6/20/18 CXR Slight increased right basilar opacity, atelectasis or infiltrate.  6/20/18 CT Cystogram   1. Right iliac fossa renal transplant, with ureteral stent in place. Moderate-sized inferior peritransplant fluid collection measuring just under 10 cm in size. Differential would include seroma, lymphocele, or urinoma.  2. No evidence of urine leak on CT cystography. Small amount of contrast in the renal collecting system related to reflux given indwelling stent.   3. Diffuse subcutaneous anasarca.    Assessment/Plan:  1. H/O ESRD due to FSGS S/P DDKT 5/26/18. POD# 26.   -- Delayed allograft function.   -- Last HD 6/13/18. HD today.  -- Creatinine 7.96. UO 1L since admission.  -- DSA pending.  -- Kidney biopsy with Dr. Wakefield tomorrow.  -- Continue Lasix 80 mg BID. ASA held due to need for potential renal allograft bx.   -- Wound infection/dehiscence.   -- Wound cx (6/5/18) grew Pseudomonas aeruginosa, E. coli, and Serratia marcescens (likely contaminant).  -- Abx completed per ID (6/15/18).  -- Vac in place, Wound care to d/c today in anticipation for biopsy tomorrow.  Wet to dry for now, replace after biopsy.  -- Concern for urine leak (noted on CT obtained 6/13/18) / urethral stricture.  -- Shah and ureteral stent in place.  -- CT Cystogram negative for urine leak (6/20/18).   -- Urology following, recommends voiding trial when ok by surgeons.   -- Nephrology following.   2. Prophylactic immunosuppression.   -- Prograf 1.5mg PO  BID, goal 7-9 ng/mL.  -- Cellcept 1G PO BID.   -- Prednisone 5mg PO QD.   3. Prophylaxis.  -- Fungal. Mycelex 10 mg QID x 1 month (end date 6/26/18).  -- CMV (D-/R+). Valcyte 450 mg twice weekly (renally dosed) x 3 months (end date 8/26/18). CMV quant pending.  -- PCP. Pentamidine 300 mg q 28 days x 6 months (end date 11/26/18). Pentamidine last administered 6/11/18.  -- UTI. Will need prophylaxis x 3 months when abx course for UTI completed (end date 8/26/18).   -- GI. Protonix 40 mg QHS x 1 months (end date 6/26/18).  -- DVT. SCDs / TEDs.   4. VRE UTI.   -- UC (6/18/18) grew 60,000-100,000 CFU/mL VRE.   -- Repeat UA negative, UC pending.  -- Abx switched to Linezolid, ID consulted.   5. Pain management. Controlled. Oxycodone IR PRN.  6. Acute blood loss anemia (H/O chronic anemia).   -- No overt bleeding.   -- Monitor hgb/coags.   7. LLE radiculopathy. Gabapentin 100 mg HS.  8. Hyponatremia. Na+ 123. On 1.5L FR. Monitor.  9. Disposition. Discharge unknown at this time.    Discussed with: patient, RN, and Nephrology.    Lisa Jennings PA-C  Abdominal Transplant & Hepatology  Pager: [phone]  6/21/2018     6

## 2022-11-16 NOTE — ASU DISCHARGE PLAN (ADULT/PEDIATRIC) - ASU DC SPECIAL INSTRUCTIONSFT
Discharge Instructions:  1. Diet: advance as tolerated  2. Activity limited by:   - no running, heavy lifting, strenuous exercise until cleared by MD   - nothing in vagina (bath, swim, intercourse, douching, tampons) until cleared by MD   3. Medications:   - Senna to prevent constipation (please hold for loose stools)  - Ibuprofen 600mg every 6 hours as needed for pain  - Acetaminophen 500mg every 6 hours as needed for pain  4. Follow-up appointment - 2 weeks. Please call the office upon discharge to schedule your appointment if you do not have one already.   5. Precautions:  - Call the office or go to the ED if you have any of the followin) Fever >100.4 that does not resolve  2) Intractable pain  3) Heavy bleeding Discharge Instructions:  1. Diet: advance as tolerated  2. Activity limited by:   - no running, heavy lifting, strenuous exercise until cleared by MD   - nothing in vagina (bath, swim, intercourse, douching, tampons) until cleared by MD   3. Medications:   - Senna to prevent constipation (please hold for loose stools)  - Ibuprofen 600mg every 6 hours as needed for pain  - Acetaminophen 975mg every 6 hours as needed for pain  4. Follow-up appointment - 2 weeks. Please call the office upon discharge to schedule your appointment if you do not have one already.   5. Precautions:  - Call the office or go to the ED if you have any of the followin) Fever >100.4 that does not resolve  2) Intractable pain  3) Heavy bleeding

## 2022-11-30 LAB — SURGICAL PATHOLOGY STUDY: SIGNIFICANT CHANGE UP

## 2022-12-08 LAB — CHROM ANALY OVERALL INTERP SPEC-IMP: SIGNIFICANT CHANGE UP

## 2023-02-10 ENCOUNTER — NON-APPOINTMENT (OUTPATIENT)
Age: 33
End: 2023-02-10

## 2023-02-24 ENCOUNTER — NON-APPOINTMENT (OUTPATIENT)
Age: 33
End: 2023-02-24

## 2023-02-24 NOTE — DISCHARGE NOTE OB - LAUNCH MEDICATION RECONCILIATION
<<-----Click here for Discharge Medication Review Keystone Flap Text: The defect edges were debeveled with a #15 scalpel blade.  Given the location of the defect, shape of the defect a keystone flap was deemed most appropriate.  Using a sterile surgical marker, an appropriate keystone flap was drawn incorporating the defect, outlining the appropriate donor tissue and placing the expected incisions within the relaxed skin tension lines where possible. The area thus outlined was incised deep to adipose tissue with a #15 scalpel blade.  The skin margins were undermined to an appropriate distance in all directions around the primary defect and laterally outward around the flap utilizing iris scissors.

## 2023-03-13 PROBLEM — U07.1 COVID-19: Chronic | Status: ACTIVE | Noted: 2022-11-14

## 2023-03-14 ENCOUNTER — OUTPATIENT (OUTPATIENT)
Dept: OUTPATIENT SERVICES | Facility: HOSPITAL | Age: 33
LOS: 1 days | End: 2023-03-14
Payer: COMMERCIAL

## 2023-03-14 VITALS — RESPIRATION RATE: 18 BRPM | WEIGHT: 268.96 LBS | HEIGHT: 66 IN

## 2023-03-14 DIAGNOSIS — O02.1 MISSED ABORTION: ICD-10-CM

## 2023-03-14 DIAGNOSIS — Z98.890 OTHER SPECIFIED POSTPROCEDURAL STATES: Chronic | ICD-10-CM

## 2023-03-14 DIAGNOSIS — Z01.818 ENCOUNTER FOR OTHER PREPROCEDURAL EXAMINATION: ICD-10-CM

## 2023-03-14 DIAGNOSIS — Z87.81 PERSONAL HISTORY OF (HEALED) TRAUMATIC FRACTURE: Chronic | ICD-10-CM

## 2023-03-14 LAB
BLD GP AB SCN SERPL QL: NEGATIVE — SIGNIFICANT CHANGE UP
HCT VFR BLD CALC: 44.5 % — SIGNIFICANT CHANGE UP (ref 34.5–45)
HGB BLD-MCNC: 14.4 G/DL — SIGNIFICANT CHANGE UP (ref 11.5–15.5)
MCHC RBC-ENTMCNC: 26.2 PG — LOW (ref 27–34)
MCHC RBC-ENTMCNC: 32.4 GM/DL — SIGNIFICANT CHANGE UP (ref 32–36)
MCV RBC AUTO: 81.1 FL — SIGNIFICANT CHANGE UP (ref 80–100)
NRBC # BLD: 0 /100 WBCS — SIGNIFICANT CHANGE UP (ref 0–0)
PLATELET # BLD AUTO: 206 K/UL — SIGNIFICANT CHANGE UP (ref 150–400)
RBC # BLD: 5.49 M/UL — HIGH (ref 3.8–5.2)
RBC # FLD: 15.6 % — HIGH (ref 10.3–14.5)
RH IG SCN BLD-IMP: POSITIVE — SIGNIFICANT CHANGE UP
WBC # BLD: 6.21 K/UL — SIGNIFICANT CHANGE UP (ref 3.8–10.5)
WBC # FLD AUTO: 6.21 K/UL — SIGNIFICANT CHANGE UP (ref 3.8–10.5)

## 2023-03-14 PROCEDURE — 36415 COLL VENOUS BLD VENIPUNCTURE: CPT

## 2023-03-14 PROCEDURE — 86901 BLOOD TYPING SEROLOGIC RH(D): CPT

## 2023-03-14 PROCEDURE — 86900 BLOOD TYPING SEROLOGIC ABO: CPT

## 2023-03-14 PROCEDURE — 86850 RBC ANTIBODY SCREEN: CPT

## 2023-03-14 PROCEDURE — G0463: CPT

## 2023-03-14 PROCEDURE — 85027 COMPLETE CBC AUTOMATED: CPT

## 2023-03-14 RX ORDER — SODIUM CHLORIDE 9 MG/ML
1000 INJECTION, SOLUTION INTRAVENOUS
Refills: 0 | Status: DISCONTINUED | OUTPATIENT
Start: 2023-03-17 | End: 2023-03-31

## 2023-03-14 RX ORDER — SODIUM CHLORIDE 9 MG/ML
3 INJECTION INTRAMUSCULAR; INTRAVENOUS; SUBCUTANEOUS EVERY 8 HOURS
Refills: 0 | Status: DISCONTINUED | OUTPATIENT
Start: 2023-03-17 | End: 2023-03-31

## 2023-03-14 RX ORDER — LIDOCAINE HCL 20 MG/ML
0.2 VIAL (ML) INJECTION ONCE
Refills: 0 | Status: DISCONTINUED | OUTPATIENT
Start: 2023-03-17 | End: 2023-03-31

## 2023-03-14 NOTE — H&P PST ADULT - ATTENDING COMMENTS
32 p1 with lmp 1/13/23 with missed ab measuring 6 weeks . requesting split specimen for genetics. Risks explained

## 2023-03-14 NOTE — H&P PST ADULT - HISTORY OF PRESENT ILLNESS
This is a 32 year old female presenting to PST for scheduled Suction D&C for missed AB on 3/17/23 with Dr. Yao  Reports having two miscarriages in the last three months.       **Covid vaccinated  ** Reports having Covid in Nov 2022- denies hospiatlzation,(Cough, fever, loss of taste/smell)     This is a 32 year old female presenting to PST for scheduled Suction D&C for missed AB on 3/17/23 with Dr. Yao. Reports having two miscarriages in the last three months. Denies any vaginal bleeding, spotting or discharge. Of note, pt was recently seen by PCP on 3/13 for evaluation of cough x 2 days. (Reports having home negative rapid covid test, denies any fever or chills) currently on Tessalon Perles PRN.       **Covid vaccinated  ** Reports having Covid in Nov 2022- denies hospitalization fever, loss of taste/smell)

## 2023-03-16 ENCOUNTER — TRANSCRIPTION ENCOUNTER (OUTPATIENT)
Age: 33
End: 2023-03-16

## 2023-03-17 ENCOUNTER — OUTPATIENT (OUTPATIENT)
Dept: OUTPATIENT SERVICES | Facility: HOSPITAL | Age: 33
LOS: 1 days | End: 2023-03-17
Payer: COMMERCIAL

## 2023-03-17 ENCOUNTER — TRANSCRIPTION ENCOUNTER (OUTPATIENT)
Age: 33
End: 2023-03-17

## 2023-03-17 VITALS
SYSTOLIC BLOOD PRESSURE: 112 MMHG | OXYGEN SATURATION: 99 % | HEART RATE: 81 BPM | RESPIRATION RATE: 18 BRPM | TEMPERATURE: 97 F | DIASTOLIC BLOOD PRESSURE: 66 MMHG

## 2023-03-17 VITALS
TEMPERATURE: 98 F | SYSTOLIC BLOOD PRESSURE: 136 MMHG | RESPIRATION RATE: 18 BRPM | WEIGHT: 268.96 LBS | OXYGEN SATURATION: 99 % | DIASTOLIC BLOOD PRESSURE: 85 MMHG | HEIGHT: 66 IN | HEART RATE: 91 BPM

## 2023-03-17 DIAGNOSIS — Z98.890 OTHER SPECIFIED POSTPROCEDURAL STATES: Chronic | ICD-10-CM

## 2023-03-17 DIAGNOSIS — Z87.81 PERSONAL HISTORY OF (HEALED) TRAUMATIC FRACTURE: Chronic | ICD-10-CM

## 2023-03-17 DIAGNOSIS — O02.1 MISSED ABORTION: ICD-10-CM

## 2023-03-17 PROCEDURE — 88233 TISSUE CULTURE SKIN/BIOPSY: CPT

## 2023-03-17 PROCEDURE — 88264 CHROMOSOME ANALYSIS 20-25: CPT

## 2023-03-17 PROCEDURE — 59820 CARE OF MISCARRIAGE: CPT

## 2023-03-17 PROCEDURE — 88280 CHROMOSOME KARYOTYPE STUDY: CPT

## 2023-03-17 RX ORDER — ONDANSETRON 8 MG/1
4 TABLET, FILM COATED ORAL ONCE
Refills: 0 | Status: DISCONTINUED | OUTPATIENT
Start: 2023-03-17 | End: 2023-03-31

## 2023-03-17 RX ORDER — FENTANYL CITRATE 50 UG/ML
25 INJECTION INTRAVENOUS
Refills: 0 | Status: DISCONTINUED | OUTPATIENT
Start: 2023-03-17 | End: 2023-03-17

## 2023-03-17 RX ADMIN — SODIUM CHLORIDE 100 MILLILITER(S): 9 INJECTION, SOLUTION INTRAVENOUS at 07:42

## 2023-03-17 NOTE — ASU DISCHARGE PLAN (ADULT/PEDIATRIC) - CARE PROVIDER_API CALL
Elif Yao (MD)  Obstetrics and Gynecology  [address], [address]  [address]  [place] [id number]  Phone: [phone]  Fax: [phone]  Follow Up Time: 2 weeks

## 2023-03-17 NOTE — ASU PATIENT PROFILE, ADULT - FALL HARM RISK - UNIVERSAL INTERVENTIONS
Bed in lowest position, wheels locked, appropriate side rails in place/Call bell, personal items and telephone in reach/Instruct patient to call for assistance before getting out of bed or chair/Non-slip footwear when patient is out of bed/[place] to call system/Physically safe environment - no spills, clutter or unnecessary equipment/Purposeful Proactive Rounding/Room/bathroom lighting operational, light cord in reach

## 2023-03-17 NOTE — ASU DISCHARGE PLAN (ADULT/PEDIATRIC) - CALL YOUR DOCTOR IF YOU HAVE ANY OF THE FOLLOWING:
Spoke to Pt in regards to his Special procedure that was done 8/20/18 w/ Dr. Peterson and he mentioned that he is doing fine.    Thank you  Mary    
Bleeding that does not stop/Fever greater than (need to indicate Fahrenheit or Celsius)/Nausea and vomiting that does not stop/Unable to urinate/Inability to tolerate liquids or foods

## 2023-03-17 NOTE — BRIEF OPERATIVE NOTE - OPERATION/FINDINGS
EUA revealed 8 wk size anteverted uterus. No adnexal fullness. Dilation and suction curettage performed. Products of conception sent to pathology. Excellent hemostasis noted.

## 2023-03-17 NOTE — ASU DISCHARGE PLAN (ADULT/PEDIATRIC) - NS MD DC FALL RISK RISK
For information on Fall & Injury Prevention, visit: [email or web address] OR  [email or web address] OR  https://www.cdc.gov/steadi/patient.html

## 2023-03-17 NOTE — ASU DISCHARGE PLAN (ADULT/PEDIATRIC) - NURSING INSTRUCTIONS
Next dose of Tylenol will be on or after __2:30pm_________ ,today/tonight and every 6 hours afterwards for pain management, do not take any Tylenol containing products until this time. Your first dose of Tylenol was given at ___8:40am________. Do not exceed more than 4000mg of Tylenol in one 24 hour setting. If no contraindications, you may alternate with Ibuprofen 3 hours after dose of Tylenol. Ibuprofen can be taken every 6 hours.

## 2023-03-17 NOTE — PRE-ANESTHESIA EVALUATION ADULT - NSANTHADDINFOFT_GEN_ALL_CORE
patient has URI with congestion and productive cough.  R/B/A discussed with patient on general anesthesia and spinal anesthesia.

## 2023-03-23 LAB — SURGICAL PATHOLOGY STUDY: SIGNIFICANT CHANGE UP

## 2023-04-10 LAB — CHROM ANALY OVERALL INTERP SPEC-IMP: SIGNIFICANT CHANGE UP

## 2023-08-24 NOTE — BRIEF OPERATIVE NOTE - NSICDXBRIEFPROCEDURE_GEN_ALL_CORE_FT
PROCEDURES:  Exam under anesthesia, pelvic 17-Mar-2023 09:10:31  Sera Perry  Dilation and curettage of uterus using suction for missed  in first trimester 17-Mar-2023 09:10:43  Sera Perry   [FreeTextEntry1] : Well woman visit completed. To monitor emotional lability.

## 2023-11-24 ENCOUNTER — NON-APPOINTMENT (OUTPATIENT)
Age: 33
End: 2023-11-24

## 2024-03-16 ENCOUNTER — INPATIENT (INPATIENT)
Facility: HOSPITAL | Age: 34
LOS: 1 days | Discharge: HOME | End: 2024-03-18
Attending: STUDENT IN AN ORGANIZED HEALTH CARE EDUCATION/TRAINING PROGRAM | Admitting: STUDENT IN AN ORGANIZED HEALTH CARE EDUCATION/TRAINING PROGRAM
Payer: COMMERCIAL

## 2024-03-16 VITALS — HEART RATE: 104 BPM | OXYGEN SATURATION: 99 %

## 2024-03-16 DIAGNOSIS — Z98.890 OTHER SPECIFIED POSTPROCEDURAL STATES: Chronic | ICD-10-CM

## 2024-03-16 DIAGNOSIS — O48.0 POST-TERM PREGNANCY: ICD-10-CM

## 2024-03-16 DIAGNOSIS — Z87.81 PERSONAL HISTORY OF (HEALED) TRAUMATIC FRACTURE: Chronic | ICD-10-CM

## 2024-03-16 PROCEDURE — 12345F: CUSTOM | Mod: NC

## 2024-03-16 RX ORDER — SODIUM CHLORIDE 9 MG/ML
1000 INJECTION, SOLUTION INTRAVENOUS
Refills: 0 | Status: DISCONTINUED | OUTPATIENT
Start: 2024-03-16 | End: 2024-03-17

## 2024-03-16 RX ORDER — CITRIC ACID/SODIUM CITRATE 300-500 MG
15 SOLUTION, ORAL ORAL EVERY 6 HOURS
Refills: 0 | Status: DISCONTINUED | OUTPATIENT
Start: 2024-03-16 | End: 2024-03-17

## 2024-03-16 RX ORDER — CHLORHEXIDINE GLUCONATE 213 G/1000ML
1 SOLUTION TOPICAL DAILY
Refills: 0 | Status: DISCONTINUED | OUTPATIENT
Start: 2024-03-16 | End: 2024-03-17

## 2024-03-16 RX ORDER — OXYTOCIN 10 UNIT/ML
333.33 VIAL (ML) INJECTION
Qty: 20 | Refills: 0 | Status: DISCONTINUED | OUTPATIENT
Start: 2024-03-16 | End: 2024-03-17

## 2024-03-16 NOTE — OB PROVIDER H&P - ATTENDING COMMENTS
I have personally seen, examined, and participated in the care of this patient. I have reviewed all pertinent clinical information, including history, physical exam, plan, and the Resident 's note and agree except as noted.    Kody Gr MD

## 2024-03-16 NOTE — OB PROVIDER H&P - NSHPPHYSICALEXAM_GEN_ALL_CORE
Objective  – Vital Signs  BP:   HR:   Temp:     – PE:   CV: RRR  Pulm: breathing comfortably on RA  Abd: gravid, nontender  Extr: moving all extremities with ease    – VE: 1.5/0/-3  – FHT: baseline 130, mod variability, +accels, -decels  – [place]: irregular  – EFW: 3770g  – Sono: vertex

## 2024-03-16 NOTE — OB RN PATIENT PROFILE - AS SC BRADEN FRICTION
· Chief Complaint: The patient is a 60y Male complaining of chest pain.  · HPI Objective Statement: 59 yo male with defibrillator, DM, htn, hld, presenting with sharp nonradiating chest pain since this morning which awoke him from his sleep, is constant non exertional, non pleuritic, no changes with positions. Pt endorses associated nausea and diaphoresis and shortness of breath. Pt also c/o sharp, vice-like bitemproal headache which also started this morning when he woke up this morning and has been getting worse in intensity. denies fevers, chills, changes in vision, abdominal pain, difficulty breahting. PTs daughter called pts cardiologist. Garret Yates who recommended he go to the ED, reviewed his loop recorder (per family unknown why he has it) and was told he has an arrythmia and should come to the ED. 60 yr old male to ed with h/o defibrillator  DM, htn, hld, c/o   sharp non radiating chest pain since this morning . Pain woke t from his sleep.  Pt c/o nausea and became diaphoretic. SOB.  Pt c/o sharp headache bilat temporal c/o sharp, Headache also started this morning. Denies fever or chills. Told to come to ED for Arrythmia (3) no apparent problem
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
## 2024-03-16 NOTE — OB RN PATIENT PROFILE - FALL HARM RISK - UNIVERSAL INTERVENTIONS
Bed in lowest position, wheels locked, appropriate side rails in place/Call bell, personal items and telephone in reach/Instruct patient to call for assistance before getting out of bed or chair/Non-slip footwear when patient is out of bed/[place] to call system/Physically safe environment - no spills, clutter or unnecessary equipment/Purposeful Proactive Rounding/Room/bathroom lighting operational, light cord in reach

## 2024-03-16 NOTE — OB PROVIDER H&P - NSLOWPPHRISK_OBGYN_A_OB
Department of Anesthesiology  Preprocedure Note       Name:  Zenaida Donald   Age:  11 wk.o.  :  2022                                          MRN:  [id number]         Date:  2020      Surgeon: Noé Villavicencio):  Ashley Lundberg MD    Procedure: Procedure(s):  LAPAROSCOPIC PYLOROMYOTOMY    Medications prior to admission:   Prior to Admission medications    Not on File       Current medications:    Current Facility-Administered Medications   Medication Dose Route Frequency Provider Last Rate Last Dose    [MAR Hold] lidocaine (LMX) 4 % cream   Topical PRN Santosh Field, DO        Charmel Miguel Angel Hold] lidocaine (LMX) 4 % cream   Topical Q30 Min PRN Bhavna Bensley, DO        [MAR Hold] sodium chloride flush 0.9 % injection 3 mL  3 mL Intravenous PRN Bhavna Bensley, DO        [MAR Hold] dextrose 5 % and 0.45 % sodium chloride infusion   Intravenous Continuous Bhavna Norbert, DO 14.7 mL/hr at 10/15/20 2219      [MAR Hold] acetaminophen (TYLENOL) suppository 60 mg  15 mg/kg Rectal Q6H PRN Bhavna Bensley, DO           Allergies:  No Known Allergies    Problem List:    Patient Active Problem List   Diagnosis Code    Term birth of  male Z45.0    Intrauterine drug exposure P04.9    Pyloric stenosis K31.1       Past Medical History:        Diagnosis Date    Pyloric stenosis        Past Surgical History:  History reviewed. No pertinent surgical history.     Social History:    Social History     Tobacco Use    Smoking status: Not on file   Substance Use Topics    Alcohol use: Not on file                                Counseling given: Not Answered      Vital Signs (Current):   Vitals:    10/16/20 0600 10/16/20 0700 10/16/20 0800 10/16/20 1115   BP: 84/47 (!) 91/65 78/44 92/39   Pulse: 113 158 130 123   Resp: 29 26  27   Temp:   97.2 °F (36.2 °C) 97.9 °F (36.6 °C)   TempSrc:   Axillary Temporal   SpO2: 98% 98% 100% 99%   Weight:    7 lb 14.3 oz (3.58 kg)   Height:       HC:
No previous uterine incision/[place] Pregnancy/Less than or equal to 4 previous vaginal births/No known bleeding disorder/No history of postpartum hemorrhage/No other PPH risks indicated

## 2024-03-16 NOTE — OB PROVIDER H&P - ASSESSMENT
Assessment   33yoF  at 41w0d gestational age presents for late term IOL, with AMA and obesity. Pt reports uncomplicated pregnancy, no hypertensive or diabetic disorders. +FM. -LOF. -CTXs. -VB. Pt denies any other concerns. GBS positive.     Plan  1. Admit to LND. Routine Labs. IVF.  2. IOL w/ buccal cytotec. Cervical balloon with 2nd dose, start pitocin at 6a.  3. Fetus: cat 1 tracing. VTX. EFW 3770g. Continuous EFM. Sono. No concerns.  4. Prenatal issues: AMA, obesity  5. GBS positive with PCN allergy, resistant to clinda and sensitive to Vanc. Will start vancomycin.  6. Pain: IV pain meds/epidural PRN    Patient discussed with attending physician, Dr. Simón Thompson MD PGY1

## 2024-03-17 LAB
BASOPHILS # BLD AUTO: 0.03 K/UL — SIGNIFICANT CHANGE UP (ref 0–0.2)
BASOPHILS NFR BLD AUTO: 0.3 % — SIGNIFICANT CHANGE UP (ref 0–2)
BLD GP AB SCN SERPL QL: NEGATIVE — SIGNIFICANT CHANGE UP
EOSINOPHIL # BLD AUTO: 0.21 K/UL — SIGNIFICANT CHANGE UP (ref 0–0.5)
EOSINOPHIL NFR BLD AUTO: 1.8 % — SIGNIFICANT CHANGE UP (ref 0–6)
HCT VFR BLD CALC: 37.8 % — SIGNIFICANT CHANGE UP (ref 34.5–45)
HGB BLD-MCNC: 12.1 G/DL — SIGNIFICANT CHANGE UP (ref 11.5–15.5)
IMM GRANULOCYTES NFR BLD AUTO: 0.4 % — SIGNIFICANT CHANGE UP (ref 0–0.9)
LYMPHOCYTES # BLD AUTO: 2.33 K/UL — SIGNIFICANT CHANGE UP (ref 1–3.3)
LYMPHOCYTES # BLD AUTO: 20.4 % — SIGNIFICANT CHANGE UP (ref 13–44)
MCHC RBC-ENTMCNC: 25.9 PG — LOW (ref 27–34)
MCHC RBC-ENTMCNC: 32 GM/DL — SIGNIFICANT CHANGE UP (ref 32–36)
MCV RBC AUTO: 80.9 FL — SIGNIFICANT CHANGE UP (ref 80–100)
MONOCYTES # BLD AUTO: 0.83 K/UL — SIGNIFICANT CHANGE UP (ref 0–0.9)
MONOCYTES NFR BLD AUTO: 7.3 % — SIGNIFICANT CHANGE UP (ref 2–14)
NEUTROPHILS # BLD AUTO: 7.97 K/UL — HIGH (ref 1.8–7.4)
NEUTROPHILS NFR BLD AUTO: 69.8 % — SIGNIFICANT CHANGE UP (ref 43–77)
NRBC # BLD: 0 /100 WBCS — SIGNIFICANT CHANGE UP (ref 0–0)
PLATELET # BLD AUTO: 265 K/UL — SIGNIFICANT CHANGE UP (ref 150–400)
RBC # BLD: 4.67 M/UL — SIGNIFICANT CHANGE UP (ref 3.8–5.2)
RBC # FLD: 15.2 % — HIGH (ref 10.3–14.5)
RH IG SCN BLD-IMP: POSITIVE — SIGNIFICANT CHANGE UP
T PALLIDUM AB TITR SER: NEGATIVE — SIGNIFICANT CHANGE UP
WBC # BLD: 11.42 K/UL — HIGH (ref 3.8–10.5)
WBC # FLD AUTO: 11.42 K/UL — HIGH (ref 3.8–10.5)

## 2024-03-17 RX ORDER — VANCOMYCIN HCL 1 G
2000 VIAL (EA) INTRAVENOUS EVERY 8 HOURS
Refills: 0 | Status: DISCONTINUED | OUTPATIENT
Start: 2024-03-17 | End: 2024-03-17

## 2024-03-17 RX ORDER — IBUPROFEN 200 MG
600 TABLET ORAL EVERY 6 HOURS
Refills: 0 | Status: COMPLETED | OUTPATIENT
Start: 2024-03-17 | End: 2025-02-13

## 2024-03-17 RX ORDER — OXYTOCIN 10 UNIT/ML
333.33 VIAL (ML) INJECTION
Qty: 20 | Refills: 0 | Status: DISCONTINUED | OUTPATIENT
Start: 2024-03-17 | End: 2024-03-18

## 2024-03-17 RX ORDER — PRAMOXINE HYDROCHLORIDE 150 MG/15G
1 AEROSOL, FOAM RECTAL EVERY 4 HOURS
Refills: 0 | Status: DISCONTINUED | OUTPATIENT
Start: 2024-03-17 | End: 2024-03-18

## 2024-03-17 RX ORDER — BENZOCAINE 10 %
1 GEL (GRAM) MUCOUS MEMBRANE EVERY 6 HOURS
Refills: 0 | Status: DISCONTINUED | OUTPATIENT
Start: 2024-03-17 | End: 2024-03-18

## 2024-03-17 RX ORDER — DIBUCAINE 1 %
1 OINTMENT (GRAM) RECTAL EVERY 6 HOURS
Refills: 0 | Status: DISCONTINUED | OUTPATIENT
Start: 2024-03-17 | End: 2024-03-18

## 2024-03-17 RX ORDER — OXYTOCIN 10 UNIT/ML
4 VIAL (ML) INJECTION
Qty: 30 | Refills: 0 | Status: DISCONTINUED | OUTPATIENT
Start: 2024-03-17 | End: 2024-03-17

## 2024-03-17 RX ORDER — TETANUS TOXOID, REDUCED DIPHTHERIA TOXOID AND ACELLULAR PERTUSSIS VACCINE, ADSORBED 5; 2.5; 8; 8; 2.5 [IU]/.5ML; [IU]/.5ML; UG/.5ML; UG/.5ML; UG/.5ML
0.5 SUSPENSION INTRAMUSCULAR ONCE
Refills: 0 | Status: DISCONTINUED | OUTPATIENT
Start: 2024-03-17 | End: 2024-03-18

## 2024-03-17 RX ORDER — KETOROLAC TROMETHAMINE 30 MG/ML
30 SYRINGE (ML) INJECTION ONCE
Refills: 0 | Status: DISCONTINUED | OUTPATIENT
Start: 2024-03-17 | End: 2024-03-17

## 2024-03-17 RX ORDER — SODIUM CHLORIDE 9 MG/ML
3 INJECTION INTRAMUSCULAR; INTRAVENOUS; SUBCUTANEOUS EVERY 8 HOURS
Refills: 0 | Status: DISCONTINUED | OUTPATIENT
Start: 2024-03-17 | End: 2024-03-18

## 2024-03-17 RX ORDER — OXYCODONE HYDROCHLORIDE 5 MG/1
5 TABLET ORAL
Refills: 0 | Status: DISCONTINUED | OUTPATIENT
Start: 2024-03-17 | End: 2024-03-18

## 2024-03-17 RX ORDER — HYDROCORTISONE 1 %
1 OINTMENT (GRAM) TOPICAL EVERY 6 HOURS
Refills: 0 | Status: DISCONTINUED | OUTPATIENT
Start: 2024-03-17 | End: 2024-03-18

## 2024-03-17 RX ORDER — IBUPROFEN 200 MG
600 TABLET ORAL EVERY 6 HOURS
Refills: 0 | Status: DISCONTINUED | OUTPATIENT
Start: 2024-03-17 | End: 2024-03-18

## 2024-03-17 RX ORDER — ACETAMINOPHEN 500 MG
1000 TABLET ORAL ONCE
Refills: 0 | Status: COMPLETED | OUTPATIENT
Start: 2024-03-17 | End: 2024-03-17

## 2024-03-17 RX ORDER — ACETAMINOPHEN 500 MG
975 TABLET ORAL
Refills: 0 | Status: DISCONTINUED | OUTPATIENT
Start: 2024-03-17 | End: 2024-03-18

## 2024-03-17 RX ORDER — DIPHENHYDRAMINE HCL 50 MG
25 CAPSULE ORAL EVERY 6 HOURS
Refills: 0 | Status: DISCONTINUED | OUTPATIENT
Start: 2024-03-17 | End: 2024-03-18

## 2024-03-17 RX ORDER — OXYCODONE HYDROCHLORIDE 5 MG/1
5 TABLET ORAL ONCE
Refills: 0 | Status: DISCONTINUED | OUTPATIENT
Start: 2024-03-17 | End: 2024-03-18

## 2024-03-17 RX ORDER — MAGNESIUM HYDROXIDE 400 MG/1
30 TABLET, CHEWABLE ORAL
Refills: 0 | Status: DISCONTINUED | OUTPATIENT
Start: 2024-03-17 | End: 2024-03-18

## 2024-03-17 RX ORDER — AER TRAVELER 0.5 G/1
1 SOLUTION RECTAL; TOPICAL EVERY 4 HOURS
Refills: 0 | Status: DISCONTINUED | OUTPATIENT
Start: 2024-03-17 | End: 2024-03-18

## 2024-03-17 RX ORDER — SIMETHICONE 80 MG/1
80 TABLET, CHEWABLE ORAL EVERY 4 HOURS
Refills: 0 | Status: DISCONTINUED | OUTPATIENT
Start: 2024-03-17 | End: 2024-03-18

## 2024-03-17 RX ORDER — ONDANSETRON 8 MG/1
4 TABLET, FILM COATED ORAL ONCE
Refills: 0 | Status: DISCONTINUED | OUTPATIENT
Start: 2024-03-17 | End: 2024-03-17

## 2024-03-17 RX ORDER — LANOLIN
1 OINTMENT (GRAM) TOPICAL EVERY 6 HOURS
Refills: 0 | Status: DISCONTINUED | OUTPATIENT
Start: 2024-03-17 | End: 2024-03-18

## 2024-03-17 RX ADMIN — Medication 975 MILLIGRAM(S): at 22:00

## 2024-03-17 RX ADMIN — Medication 400 MILLIGRAM(S): at 03:38

## 2024-03-17 RX ADMIN — Medication 187.5 MILLIGRAM(S): at 11:03

## 2024-03-17 RX ADMIN — Medication 187.5 MILLIGRAM(S): at 01:32

## 2024-03-17 RX ADMIN — Medication 975 MILLIGRAM(S): at 21:10

## 2024-03-17 RX ADMIN — Medication 30 MILLIGRAM(S): at 17:24

## 2024-03-17 RX ADMIN — SODIUM CHLORIDE 125 MILLILITER(S): 9 INJECTION, SOLUTION INTRAVENOUS at 00:07

## 2024-03-17 RX ADMIN — SODIUM CHLORIDE 3 MILLILITER(S): 9 INJECTION INTRAMUSCULAR; INTRAVENOUS; SUBCUTANEOUS at 22:54

## 2024-03-17 RX ADMIN — Medication 4 MILLIUNIT(S)/MIN: at 07:52

## 2024-03-17 NOTE — CHART NOTE - NSCHARTNOTEFT_GEN_A_CORE
Evaluation of patient position. Confirmed vertex with bedside sono.    Maya Gonzalez, PGY1
Pt seen and evaluated at bedside. Feeling well. Epidural in place but minimal pain improvement. Just received top-off but if no improvement in next 30min, anesthesia to re-do epidural. CB currently inplace and on pitocin. Will c/w IOL for late term. All questions answered and patient consented at this time.    Kody Gr MD

## 2024-03-17 NOTE — OB PROVIDER LABOR PROGRESS NOTE - NS_OBIHIFHRDETAILS_OBGYN_ALL_OB_FT
130, mod [place], + accels, - decels DEBRA
EFM: 120/mod. variability/+accels/-decels
130, min [place], - accels, decels. CAT2

## 2024-03-17 NOTE — OB RN DELIVERY SUMMARY - NS_SEPSISRSKCALC_OBGYN_ALL_OB_FT
EOS calculated successfully. EOS Risk Factor: 0.5/1000 live births ([facility] national incidence); GA=41w1d; Temp=98.24; ROM=0.583; GBS='Positive'; Antibiotics='Broad spectrum antibiotics > 4 hrs prior to birth'

## 2024-03-17 NOTE — OB PROVIDER LABOR PROGRESS NOTE - NS_SUBJECTIVE/OBJECTIVE_OBGYN_ALL_OB_FT
R1 Labor & Delivery Progress Note       Pt seen & examined at bedside for placement of balloon.           T(C): 36.8 (03-17-24 @ 01:42), Max: 36.8 (03-16-24 @ 22:58)  HR: 82 (03-17-24 @ 03:10) (66 - 104)  BP: 118/67 (03-17-24 @ 01:42) (118/67 - 133/64)  RR: 16 (03-16-24 @ 23:26) (16 - 16)  SpO2: 98% (03-17-24 @ 03:10) (89% - 100%)

## 2024-03-17 NOTE — OB PROVIDER DELIVERY SUMMARY - NSPROVIDERDELIVERYNOTE_OBGYN_ALL_OB_FT
Spontaneous vaginal delivery of liveborn infant from OA position. Head, shoulders, and body delivered easily. Infant was suctioned. No mec. Cord was clamped and cut and infant was passed to mother. Placenta delivered intact with a 3 vessel cord. Fundal massage was given and uterine fundus was found to be firm. Vaginal exam revealed an intact cervix, vaginal walls and sulci. Patient had a 2nd degree laceration in the perineum that was repaired with 2.0 vicryl suture. Excellent hemostasis was noted. Patient was stable. Count was correct x 2.

## 2024-03-17 NOTE — OB PROVIDER LABOR PROGRESS NOTE - ASSESSMENT
AROM clear fluid  Repositioned.  If cant  contraction pattern, will consider IUPC placement.    Kody Gr MD 
    Plan: 33y y/o @ 41w1d in stable condition  - CB placed without issue  - Con't IOL with buccal cyto  - Continuous EFM, [place]  - Con't IVF    d/w attending physician Dr. Simón Thompson MD, PGY1
cw pitocin at this time  cervical balloon expulsed  may AROM in 1-2hours of fetal head descent.    Kody Gr MD

## 2024-03-17 NOTE — OB RN DELIVERY SUMMARY - NSSELHIDDEN_OBGYN_ALL_OB_FT
[[email or web address]_OBGYN_ALL_OB_FT:[email or web address]=],[NS_DeliveryRN_OBGYN_ALL_OB_FT:[id number]]

## 2024-03-17 NOTE — OB PROVIDER DELIVERY SUMMARY - NSLOWPPHRISK_OBGYN_A_OB
No previous uterine incision/[place] Pregnancy/Less than or equal to 4 previous vaginal births/No known bleeding disorder/No history of postpartum hemorrhage/No other PPH risks indicated

## 2024-03-18 ENCOUNTER — TRANSCRIPTION ENCOUNTER (OUTPATIENT)
Age: 34
End: 2024-03-18

## 2024-03-18 VITALS
DIASTOLIC BLOOD PRESSURE: 72 MMHG | TEMPERATURE: 98 F | HEART RATE: 61 BPM | OXYGEN SATURATION: 98 % | RESPIRATION RATE: 18 BRPM | SYSTOLIC BLOOD PRESSURE: 112 MMHG

## 2024-03-18 PROCEDURE — 86850 RBC ANTIBODY SCREEN: CPT

## 2024-03-18 PROCEDURE — 85025 COMPLETE CBC W/AUTO DIFF WBC: CPT

## 2024-03-18 PROCEDURE — 86900 BLOOD TYPING SEROLOGIC ABO: CPT

## 2024-03-18 PROCEDURE — 86780 TREPONEMA PALLIDUM: CPT

## 2024-03-18 PROCEDURE — 59050 FETAL MONITOR W/REPORT: CPT

## 2024-03-18 PROCEDURE — 86901 BLOOD TYPING SEROLOGIC RH(D): CPT

## 2024-03-18 RX ORDER — IBUPROFEN 200 MG
1 TABLET ORAL
Qty: 0 | Refills: 0 | DISCHARGE
Start: 2024-03-18

## 2024-03-18 RX ORDER — ACETAMINOPHEN 500 MG
3 TABLET ORAL
Qty: 0 | Refills: 0 | DISCHARGE

## 2024-03-18 RX ORDER — IBUPROFEN 200 MG
1 TABLET ORAL
Qty: 0 | Refills: 0 | DISCHARGE

## 2024-03-18 RX ORDER — FLUTICASONE PROPIONATE AND SALMETEROL 50; 250 UG/1; UG/1
1 POWDER ORAL; RESPIRATORY (INHALATION)
Qty: 0 | Refills: 0 | DISCHARGE

## 2024-03-18 RX ADMIN — Medication 975 MILLIGRAM(S): at 03:45

## 2024-03-18 RX ADMIN — Medication 600 MILLIGRAM(S): at 05:53

## 2024-03-18 RX ADMIN — Medication 600 MILLIGRAM(S): at 11:33

## 2024-03-18 RX ADMIN — Medication 600 MILLIGRAM(S): at 01:00

## 2024-03-18 RX ADMIN — Medication 600 MILLIGRAM(S): at 12:30

## 2024-03-18 RX ADMIN — Medication 975 MILLIGRAM(S): at 08:41

## 2024-03-18 RX ADMIN — Medication 600 MILLIGRAM(S): at 00:07

## 2024-03-18 RX ADMIN — Medication 975 MILLIGRAM(S): at 02:45

## 2024-03-18 RX ADMIN — Medication 600 MILLIGRAM(S): at 06:34

## 2024-03-18 RX ADMIN — Medication 975 MILLIGRAM(S): at 09:40

## 2024-03-18 RX ADMIN — Medication 975 MILLIGRAM(S): at 16:02

## 2024-03-18 RX ADMIN — Medication 975 MILLIGRAM(S): at 17:00

## 2024-03-18 RX ADMIN — Medication 1 TABLET(S): at 11:33

## 2024-03-18 NOTE — DISCHARGE NOTE OB - MEDICATION SUMMARY - MEDICATIONS TO STOP TAKING
I will STOP taking the medications listed below when I get home from the hospital:    doxycycline monohydrate 100 mg oral capsule  -- 1 cap(s) by mouth 2 times a day

## 2024-03-18 NOTE — PROGRESS NOTE ADULT - ATTENDING COMMENTS
Doing well  VSS afeb   Abd S NT ND FF min lochia  S/P  stable for dc home after 24h  Instruc reviewed  Fu 6 wks  AVE Rodriguez

## 2024-03-18 NOTE — PROGRESS NOTE ADULT - ASSESSMENT
A/P: 33y   PPD # 1 S/P  doing well    Current Issues: None  PAST MEDICAL & SURGICAL HISTORY:  Obese (BMI 44.4)    H/O fracture of R arm S/P repair    History of D&C

## 2024-03-18 NOTE — DISCHARGE NOTE OB - NS MD DC FALL RISK RISK
36.4 For information on Fall & Injury Prevention, visit: [email or web address] OR  [email or web address] OR  https://www.cdc.gov/steadi/patient.html

## 2024-03-18 NOTE — PROGRESS NOTE ADULT - PROBLEM SELECTOR PLAN 1
Increase OOB  Regular diet  PO Pain protocol  Routine Postpartum Care  Discharge planning    Dyan Concepcion FNP-BC

## 2024-03-18 NOTE — DISCHARGE NOTE OB - PATIENT PORTAL LINK FT
You can access the FollowMyHealth Patient Portal offered by [facility] by registering at the following website: [email or web address]. By joining [employer]’s FollowMyHealth portal, you will also be able to view your health information using other applications (apps) compatible with our system.

## 2024-03-18 NOTE — DISCHARGE NOTE OB - CARE PROVIDER_API CALL
Chicho Rodriguez  Obstetrics and Gynecology  [address]  [place] [address]  Phone: [phone]  Fax: [phone]  Follow Up Time:

## 2024-03-18 NOTE — PROGRESS NOTE ADULT - SUBJECTIVE AND OBJECTIVE BOX
Postpartum Note- PPD#1    Allergies: penicillin (Rash)    Blood Type A   Positive  Rubella Immune  RPR : Negative    S:Patient is a  33y  PPD#1 S/P    Patient w/o complaints, pain is controlled.    Pt is OOB, tolerating PO, passing flatus. Lochia WNL.     Feeding: Breast    O:  Vital Signs Last 24 Hrs  T(C): 36.6 (18 Mar 2024 05:50), Max: 37.2 (17 Mar 2024 18:30)  T(F): 97.8 (18 Mar 2024 05:50), Max: 99 (17 Mar 2024 18:30)  HR: 61 (18 Mar 2024 05:50) (61 - 102)  BP: 112/72 (18 Mar 2024 05:50) (107/57 - 148/70)  BP(mean): --  RR: 18 (18 Mar 2024 05:50) (17 - 18)  SpO2: 98% (18 Mar 2024 05:50) (89% - 100%)     Gen: NAD  Abdomen: Soft, nontender, non-distended, fundus firm.  Vaginal: Lochia WNL  Ext: Neg calf tenderness, neg edema    LABS:    Hemoglobin: 12.1 g/dL ( @ 23:54)      Hematocrit: 37.8 % ( @ 23:54)         dental pain

## 2024-07-19 NOTE — ED ADULT TRIAGE NOTE - ESI TRIAGE ACUITY LEVEL, MLM
4 Constitutional: awake and alert, uncomfortable appearing  HEENT: head normocephalic and atraumatic. moist mucous membranes  Eyes: extraocular movements intact, normal conjunctiva  Neck: supple, normal ROM  Cardiovascular: regular rate   Pulmonary: no respiratory distress  Gastrointestinal: abdomen flat and nondistended, nontender to palpation, no CVA TTP  Skin: warm, dry, normal for ethnicity  Musculoskeletal: no edema, no deformity  Neurological: oriented x4, no focal neurologic deficit.   Psychiatric: calm and cooperative

## 2024-07-21 ENCOUNTER — NON-APPOINTMENT (OUTPATIENT)
Age: 34
End: 2024-07-21

## 2025-01-20 ENCOUNTER — NON-APPOINTMENT (OUTPATIENT)
Age: 35
End: 2025-01-20

## 2025-04-15 NOTE — OB PROVIDER H&P - LABOR: CERVICAL CONSISTENCY
Received form from, [facility] 
medium

## 2025-04-30 PROBLEM — Z00.00 ENCOUNTER FOR PREVENTIVE HEALTH EXAMINATION: Status: ACTIVE | Noted: 2025-04-30

## 2025-05-05 ENCOUNTER — APPOINTMENT (OUTPATIENT)
Dept: BARIATRICS | Facility: CLINIC | Age: 35
End: 2025-05-05
Payer: COMMERCIAL

## 2025-05-05 VITALS
DIASTOLIC BLOOD PRESSURE: 86 MMHG | HEART RATE: 65 BPM | OXYGEN SATURATION: 96 % | HEIGHT: 66 IN | TEMPERATURE: 97.9 F | BODY MASS INDEX: 39.97 KG/M2 | SYSTOLIC BLOOD PRESSURE: 133 MMHG | WEIGHT: 248.68 LBS

## 2025-05-05 DIAGNOSIS — Z00.00 ENCOUNTER FOR GENERAL ADULT MEDICAL EXAMINATION W/OUT ABNORMAL FINDINGS: ICD-10-CM

## 2025-05-05 DIAGNOSIS — Z82.49 FAMILY HISTORY OF ISCHEMIC HEART DISEASE AND OTHER DISEASES OF THE CIRCULATORY SYSTEM: ICD-10-CM

## 2025-05-05 DIAGNOSIS — E66.01 MORBID (SEVERE) OBESITY DUE TO EXCESS CALORIES: ICD-10-CM

## 2025-05-05 DIAGNOSIS — R63.5 ABNORMAL WEIGHT GAIN: ICD-10-CM

## 2025-05-05 DIAGNOSIS — Z78.9 OTHER SPECIFIED HEALTH STATUS: ICD-10-CM

## 2025-05-05 DIAGNOSIS — Z86.39 PERSONAL HISTORY OF OTHER ENDOCRINE, NUTRITIONAL AND METABOLIC DISEASE: ICD-10-CM

## 2025-05-05 DIAGNOSIS — Z80.0 FAMILY HISTORY OF MALIGNANT NEOPLASM OF DIGESTIVE ORGANS: ICD-10-CM

## 2025-05-05 DIAGNOSIS — Z86.69 PERSONAL HISTORY OF OTHER DISEASES OF THE NERVOUS SYSTEM AND SENSE ORGANS: ICD-10-CM

## 2025-05-05 DIAGNOSIS — R63.8 OTHER SYMPTOMS AND SIGNS CONCERNING FOOD AND FLUID INTAKE: ICD-10-CM

## 2025-05-05 DIAGNOSIS — Z86.59 PERSONAL HISTORY OF OTHER MENTAL AND BEHAVIORAL DISORDERS: ICD-10-CM

## 2025-05-05 DIAGNOSIS — E46 UNSPECIFIED PROTEIN-CALORIE MALNUTRITION: ICD-10-CM

## 2025-05-05 PROCEDURE — 99205 OFFICE O/P NEW HI 60 MIN: CPT

## 2025-05-05 RX ORDER — ESCITALOPRAM OXALATE 10 MG/1
10 TABLET, FILM COATED ORAL
Refills: 0 | Status: ACTIVE | COMMUNITY

## 2025-05-13 ENCOUNTER — NON-APPOINTMENT (OUTPATIENT)
Age: 35
End: 2025-05-13

## 2025-06-19 ENCOUNTER — APPOINTMENT (OUTPATIENT)
Dept: BARIATRICS | Facility: CLINIC | Age: 35
End: 2025-06-19

## 2025-06-27 ENCOUNTER — APPOINTMENT (OUTPATIENT)
Dept: BARIATRICS/WEIGHT MGMT | Facility: CLINIC | Age: 35
End: 2025-06-27

## (undated) DEVICE — VACUUM CURETTE BERKLEY OLYMPUS CURVED 8MM

## (undated) DEVICE — VACUUM CURETTE BUSSE HOSP STRAIGHT 7MM

## (undated) DEVICE — VACUUM CURETTE BERKLEY OLYMPUS CURVED 7MM

## (undated) DEVICE — VACUUM CURETTE BUSSE HOSP CURVED 11MM

## (undated) DEVICE — GLV 6.5 PROTEXIS (WHITE)

## (undated) DEVICE — VACUUM CURETTE BUSSE HOSP CURVED 10MM

## (undated) DEVICE — POSITIONER FOAM EGG CRATE ULNAR 2PCS (PINK)

## (undated) DEVICE — SOL IRR POUR NS 0.9% 500ML

## (undated) DEVICE — VACUUM CURETTE BERKLEY OLYMPUS F TIP 6MM

## (undated) DEVICE — VACUUM CURETTE BERKLEY OLYMPUS CURVED 9MM

## (undated) DEVICE — VACUUM CURETTE BERKLEY OLYMPUS CURVED 12MM

## (undated) DEVICE — SOCK SPECIMEN 3/8"-1/2" MALE PORT

## (undated) DEVICE — FOLEY TRAY 16FR LF URINE METER SURESTEP

## (undated) DEVICE — VACUUM CURETTE BUSSE HOSP CURVED 9MM

## (undated) DEVICE — PREP BETADINE KIT

## (undated) DEVICE — TUBING UTERINE ASPIRATION 3/8" X 6FT W/O ADAPTER

## (undated) DEVICE — WARMING BLANKET UPPER ADULT

## (undated) DEVICE — PREP BETADINE SPONGE STICKS

## (undated) DEVICE — DRAPE LIGHT HANDLE COVER (GREEN)

## (undated) DEVICE — PACK LITHOTOMY

## (undated) DEVICE — VENODYNE/SCD SLEEVE CALF MEDIUM

## (undated) DEVICE — VACUUM CURETTE MEDGYN CURVED 8MM

## (undated) DEVICE — VACUUM CURETTE BUSSE HOSP CURVED 14MM

## (undated) DEVICE — VACUUM CURETTE BERKLEY OLYMPUS CURVED 16MM X 1/2"

## (undated) DEVICE — VACUUM CURETTE BERKLEY OLYMPUS CURVED 11MM

## (undated) DEVICE — VACUUM CURETTE MEDGYN CURVED 13MM

## (undated) DEVICE — VACUUM CURETTE BUSSE HOSP CURVED 12MM

## (undated) DEVICE — DRAPE 1/2 SHEET 40X57"